# Patient Record
Sex: FEMALE | Race: WHITE | Employment: OTHER | URBAN - METROPOLITAN AREA
[De-identification: names, ages, dates, MRNs, and addresses within clinical notes are randomized per-mention and may not be internally consistent; named-entity substitution may affect disease eponyms.]

---

## 2017-05-01 ENCOUNTER — OFFICE VISIT (OUTPATIENT)
Dept: OBGYN | Age: 77
End: 2017-05-01
Payer: MEDICARE

## 2017-05-01 VITALS
BODY MASS INDEX: 25.65 KG/M2 | SYSTOLIC BLOOD PRESSURE: 118 MMHG | DIASTOLIC BLOOD PRESSURE: 72 MMHG | WEIGHT: 163.4 LBS | HEIGHT: 67 IN

## 2017-05-01 DIAGNOSIS — Z46.89 PESSARY MAINTENANCE: Primary | ICD-10-CM

## 2017-05-01 DIAGNOSIS — N81.11 MIDLINE CYSTOCELE: ICD-10-CM

## 2017-05-01 PROCEDURE — 4040F PNEUMOC VAC/ADMIN/RCVD: CPT | Performed by: ADVANCED PRACTICE MIDWIFE

## 2017-05-01 PROCEDURE — G8400 PT W/DXA NO RESULTS DOC: HCPCS | Performed by: ADVANCED PRACTICE MIDWIFE

## 2017-05-01 PROCEDURE — 1090F PRES/ABSN URINE INCON ASSESS: CPT | Performed by: ADVANCED PRACTICE MIDWIFE

## 2017-05-01 PROCEDURE — G8427 DOCREV CUR MEDS BY ELIG CLIN: HCPCS | Performed by: ADVANCED PRACTICE MIDWIFE

## 2017-05-01 PROCEDURE — 99213 OFFICE O/P EST LOW 20 MIN: CPT | Performed by: ADVANCED PRACTICE MIDWIFE

## 2017-05-01 PROCEDURE — 1036F TOBACCO NON-USER: CPT | Performed by: ADVANCED PRACTICE MIDWIFE

## 2017-05-01 PROCEDURE — 1123F ACP DISCUSS/DSCN MKR DOCD: CPT | Performed by: ADVANCED PRACTICE MIDWIFE

## 2017-05-01 PROCEDURE — 3017F COLORECTAL CA SCREEN DOC REV: CPT | Performed by: ADVANCED PRACTICE MIDWIFE

## 2017-05-01 PROCEDURE — G8420 CALC BMI NORM PARAMETERS: HCPCS | Performed by: ADVANCED PRACTICE MIDWIFE

## 2017-05-01 ASSESSMENT — PATIENT HEALTH QUESTIONNAIRE - PHQ9
2. FEELING DOWN, DEPRESSED OR HOPELESS: 0
1. LITTLE INTEREST OR PLEASURE IN DOING THINGS: 0
SUM OF ALL RESPONSES TO PHQ QUESTIONS 1-9: 0
SUM OF ALL RESPONSES TO PHQ9 QUESTIONS 1 & 2: 0

## 2017-08-01 ENCOUNTER — OFFICE VISIT (OUTPATIENT)
Dept: OBGYN | Age: 77
End: 2017-08-01
Payer: MEDICARE

## 2017-08-01 VITALS
WEIGHT: 164 LBS | DIASTOLIC BLOOD PRESSURE: 74 MMHG | SYSTOLIC BLOOD PRESSURE: 112 MMHG | BODY MASS INDEX: 26.36 KG/M2 | HEIGHT: 66 IN

## 2017-08-01 DIAGNOSIS — Z90.79 S/P TOTAL HYSTERECTOMY AND BSO (BILATERAL SALPINGO-OOPHORECTOMY): ICD-10-CM

## 2017-08-01 DIAGNOSIS — Z90.710 S/P TOTAL HYSTERECTOMY AND BSO (BILATERAL SALPINGO-OOPHORECTOMY): ICD-10-CM

## 2017-08-01 DIAGNOSIS — Z90.722 S/P TOTAL HYSTERECTOMY AND BSO (BILATERAL SALPINGO-OOPHORECTOMY): ICD-10-CM

## 2017-08-01 DIAGNOSIS — N81.11 MIDLINE CYSTOCELE: ICD-10-CM

## 2017-08-01 DIAGNOSIS — Z46.89 PESSARY MAINTENANCE: Primary | ICD-10-CM

## 2017-08-01 PROCEDURE — 1036F TOBACCO NON-USER: CPT | Performed by: ADVANCED PRACTICE MIDWIFE

## 2017-08-01 PROCEDURE — 4040F PNEUMOC VAC/ADMIN/RCVD: CPT | Performed by: ADVANCED PRACTICE MIDWIFE

## 2017-08-01 PROCEDURE — G8400 PT W/DXA NO RESULTS DOC: HCPCS | Performed by: ADVANCED PRACTICE MIDWIFE

## 2017-08-01 PROCEDURE — 99212 OFFICE O/P EST SF 10 MIN: CPT | Performed by: ADVANCED PRACTICE MIDWIFE

## 2017-08-01 PROCEDURE — G8419 CALC BMI OUT NRM PARAM NOF/U: HCPCS | Performed by: ADVANCED PRACTICE MIDWIFE

## 2017-08-01 PROCEDURE — G8427 DOCREV CUR MEDS BY ELIG CLIN: HCPCS | Performed by: ADVANCED PRACTICE MIDWIFE

## 2017-08-01 PROCEDURE — 3017F COLORECTAL CA SCREEN DOC REV: CPT | Performed by: ADVANCED PRACTICE MIDWIFE

## 2017-08-01 PROCEDURE — 1090F PRES/ABSN URINE INCON ASSESS: CPT | Performed by: ADVANCED PRACTICE MIDWIFE

## 2017-08-01 PROCEDURE — 1123F ACP DISCUSS/DSCN MKR DOCD: CPT | Performed by: ADVANCED PRACTICE MIDWIFE

## 2017-08-01 RX ORDER — ERGOCALCIFEROL 1.25 MG/1
CAPSULE ORAL
Refills: 3 | COMMUNITY
Start: 2017-06-16

## 2017-08-01 RX ORDER — MELOXICAM 15 MG/1
TABLET ORAL
Refills: 5 | COMMUNITY
Start: 2017-05-17 | End: 2017-12-20 | Stop reason: ALTCHOICE

## 2017-10-17 ENCOUNTER — TELEPHONE (OUTPATIENT)
Dept: OBGYN | Age: 77
End: 2017-10-17

## 2017-11-20 DIAGNOSIS — N95.2 VAGINAL ATROPHY: ICD-10-CM

## 2017-11-20 DIAGNOSIS — Z00.00 ENCOUNTER FOR PREVENTIVE HEALTH EXAMINATION: ICD-10-CM

## 2017-11-20 DIAGNOSIS — Z01.419 ENCOUNTER FOR GYNECOLOGICAL EXAMINATION WITHOUT ABNORMAL FINDING: ICD-10-CM

## 2017-12-20 ENCOUNTER — OFFICE VISIT (OUTPATIENT)
Dept: OBGYN | Age: 77
End: 2017-12-20
Payer: MEDICARE

## 2017-12-20 VITALS
DIASTOLIC BLOOD PRESSURE: 80 MMHG | WEIGHT: 161 LBS | SYSTOLIC BLOOD PRESSURE: 138 MMHG | HEIGHT: 67 IN | BODY MASS INDEX: 25.27 KG/M2

## 2017-12-20 DIAGNOSIS — N95.2 VAGINAL ATROPHY: ICD-10-CM

## 2017-12-20 DIAGNOSIS — Z46.89 PESSARY MAINTENANCE: ICD-10-CM

## 2017-12-20 PROBLEM — D30.02 RENAL ONCOCYTOMA OF LEFT KIDNEY: Status: ACTIVE | Noted: 2017-08-26

## 2017-12-20 PROBLEM — F41.9 ANXIETY: Status: ACTIVE | Noted: 2017-08-26

## 2017-12-20 PROBLEM — D64.9 NORMOCYTIC ANEMIA: Status: ACTIVE | Noted: 2017-08-26

## 2017-12-20 PROBLEM — Z85.3 HISTORY OF BREAST CANCER: Status: ACTIVE | Noted: 2017-08-26

## 2017-12-20 PROCEDURE — G8427 DOCREV CUR MEDS BY ELIG CLIN: HCPCS | Performed by: ADVANCED PRACTICE MIDWIFE

## 2017-12-20 PROCEDURE — 99212 OFFICE O/P EST SF 10 MIN: CPT | Performed by: ADVANCED PRACTICE MIDWIFE

## 2017-12-20 PROCEDURE — G8417 CALC BMI ABV UP PARAM F/U: HCPCS | Performed by: ADVANCED PRACTICE MIDWIFE

## 2017-12-20 PROCEDURE — 4040F PNEUMOC VAC/ADMIN/RCVD: CPT | Performed by: ADVANCED PRACTICE MIDWIFE

## 2017-12-20 PROCEDURE — 1036F TOBACCO NON-USER: CPT | Performed by: ADVANCED PRACTICE MIDWIFE

## 2017-12-20 PROCEDURE — 1090F PRES/ABSN URINE INCON ASSESS: CPT | Performed by: ADVANCED PRACTICE MIDWIFE

## 2017-12-20 PROCEDURE — 1123F ACP DISCUSS/DSCN MKR DOCD: CPT | Performed by: ADVANCED PRACTICE MIDWIFE

## 2017-12-20 PROCEDURE — G8400 PT W/DXA NO RESULTS DOC: HCPCS | Performed by: ADVANCED PRACTICE MIDWIFE

## 2017-12-20 PROCEDURE — G8482 FLU IMMUNIZE ORDER/ADMIN: HCPCS | Performed by: ADVANCED PRACTICE MIDWIFE

## 2017-12-20 NOTE — PROGRESS NOTES
total hysterectomy and BSO (bilateral salpingo-oophorectomy) 10/23/2015    Midline cystocele 10/23/2015    Post-menopause on HRT (hormone replacement therapy) 10/23/2015    Blepharochalasis 08/27/2013           Plan:  1. Return to the office 8 weeks  2. Report any vaginal bleeding or discharge  3. Abstinence         She was  counseled on her preventative health maintenance recommendations and follow-up.              FF time: 10 min    Ximena Li,12/20/2017 10:22 AM

## 2018-05-03 ENCOUNTER — OFFICE VISIT (OUTPATIENT)
Dept: OBGYN | Age: 78
End: 2018-05-03
Payer: MEDICARE

## 2018-05-03 VITALS
SYSTOLIC BLOOD PRESSURE: 112 MMHG | BODY MASS INDEX: 25.84 KG/M2 | DIASTOLIC BLOOD PRESSURE: 76 MMHG | WEIGHT: 160.8 LBS | HEIGHT: 66 IN

## 2018-05-03 DIAGNOSIS — N89.8 VAGINAL GRANULATION TISSUE: ICD-10-CM

## 2018-05-03 DIAGNOSIS — N81.11 MIDLINE CYSTOCELE: ICD-10-CM

## 2018-05-03 DIAGNOSIS — Z46.89 PESSARY MAINTENANCE: Primary | ICD-10-CM

## 2018-05-03 PROCEDURE — G8400 PT W/DXA NO RESULTS DOC: HCPCS | Performed by: ADVANCED PRACTICE MIDWIFE

## 2018-05-03 PROCEDURE — 99213 OFFICE O/P EST LOW 20 MIN: CPT | Performed by: ADVANCED PRACTICE MIDWIFE

## 2018-05-03 PROCEDURE — G8417 CALC BMI ABV UP PARAM F/U: HCPCS | Performed by: ADVANCED PRACTICE MIDWIFE

## 2018-05-03 PROCEDURE — 4040F PNEUMOC VAC/ADMIN/RCVD: CPT | Performed by: ADVANCED PRACTICE MIDWIFE

## 2018-05-03 PROCEDURE — 1123F ACP DISCUSS/DSCN MKR DOCD: CPT | Performed by: ADVANCED PRACTICE MIDWIFE

## 2018-05-03 PROCEDURE — 1036F TOBACCO NON-USER: CPT | Performed by: ADVANCED PRACTICE MIDWIFE

## 2018-05-03 PROCEDURE — G8427 DOCREV CUR MEDS BY ELIG CLIN: HCPCS | Performed by: ADVANCED PRACTICE MIDWIFE

## 2018-05-03 PROCEDURE — 1090F PRES/ABSN URINE INCON ASSESS: CPT | Performed by: ADVANCED PRACTICE MIDWIFE

## 2018-05-03 ASSESSMENT — PATIENT HEALTH QUESTIONNAIRE - PHQ9
SUM OF ALL RESPONSES TO PHQ QUESTIONS 1-9: 0
SUM OF ALL RESPONSES TO PHQ9 QUESTIONS 1 & 2: 0
1. LITTLE INTEREST OR PLEASURE IN DOING THINGS: 0
2. FEELING DOWN, DEPRESSED OR HOPELESS: 0

## 2018-05-16 ENCOUNTER — OFFICE VISIT (OUTPATIENT)
Dept: OBGYN | Age: 78
End: 2018-05-16
Payer: MEDICARE

## 2018-05-16 VITALS
DIASTOLIC BLOOD PRESSURE: 74 MMHG | HEIGHT: 67 IN | WEIGHT: 161 LBS | SYSTOLIC BLOOD PRESSURE: 128 MMHG | BODY MASS INDEX: 25.27 KG/M2

## 2018-05-16 DIAGNOSIS — N81.11 MIDLINE CYSTOCELE: Primary | ICD-10-CM

## 2018-05-16 DIAGNOSIS — N89.8 VAGINAL GRANULATION TISSUE: ICD-10-CM

## 2018-05-16 PROBLEM — E78.5 HYPERLIPIDEMIA: Status: ACTIVE | Noted: 2018-05-16

## 2018-05-16 PROBLEM — K59.01 SLOW TRANSIT CONSTIPATION: Status: ACTIVE | Noted: 2018-05-01

## 2018-05-16 PROCEDURE — G8427 DOCREV CUR MEDS BY ELIG CLIN: HCPCS | Performed by: ADVANCED PRACTICE MIDWIFE

## 2018-05-16 PROCEDURE — 1036F TOBACCO NON-USER: CPT | Performed by: ADVANCED PRACTICE MIDWIFE

## 2018-05-16 PROCEDURE — G8417 CALC BMI ABV UP PARAM F/U: HCPCS | Performed by: ADVANCED PRACTICE MIDWIFE

## 2018-05-16 PROCEDURE — 1090F PRES/ABSN URINE INCON ASSESS: CPT | Performed by: ADVANCED PRACTICE MIDWIFE

## 2018-05-16 PROCEDURE — 99213 OFFICE O/P EST LOW 20 MIN: CPT | Performed by: ADVANCED PRACTICE MIDWIFE

## 2018-05-16 PROCEDURE — 4040F PNEUMOC VAC/ADMIN/RCVD: CPT | Performed by: ADVANCED PRACTICE MIDWIFE

## 2018-05-16 PROCEDURE — 1123F ACP DISCUSS/DSCN MKR DOCD: CPT | Performed by: ADVANCED PRACTICE MIDWIFE

## 2018-05-16 PROCEDURE — G8400 PT W/DXA NO RESULTS DOC: HCPCS | Performed by: ADVANCED PRACTICE MIDWIFE

## 2018-08-29 ENCOUNTER — OFFICE VISIT (OUTPATIENT)
Dept: OBGYN | Age: 78
End: 2018-08-29
Payer: MEDICARE

## 2018-08-29 VITALS
HEIGHT: 67 IN | BODY MASS INDEX: 25.24 KG/M2 | DIASTOLIC BLOOD PRESSURE: 68 MMHG | SYSTOLIC BLOOD PRESSURE: 110 MMHG | WEIGHT: 160.8 LBS

## 2018-08-29 DIAGNOSIS — Z46.89 PESSARY MAINTENANCE: ICD-10-CM

## 2018-08-29 DIAGNOSIS — N81.11 MIDLINE CYSTOCELE: Primary | ICD-10-CM

## 2018-08-29 PROCEDURE — 99213 OFFICE O/P EST LOW 20 MIN: CPT | Performed by: ADVANCED PRACTICE MIDWIFE

## 2018-08-29 PROCEDURE — 1036F TOBACCO NON-USER: CPT | Performed by: ADVANCED PRACTICE MIDWIFE

## 2018-08-29 PROCEDURE — 1101F PT FALLS ASSESS-DOCD LE1/YR: CPT | Performed by: ADVANCED PRACTICE MIDWIFE

## 2018-08-29 PROCEDURE — G8400 PT W/DXA NO RESULTS DOC: HCPCS | Performed by: ADVANCED PRACTICE MIDWIFE

## 2018-08-29 PROCEDURE — G8428 CUR MEDS NOT DOCUMENT: HCPCS | Performed by: ADVANCED PRACTICE MIDWIFE

## 2018-08-29 PROCEDURE — 1123F ACP DISCUSS/DSCN MKR DOCD: CPT | Performed by: ADVANCED PRACTICE MIDWIFE

## 2018-08-29 PROCEDURE — G8417 CALC BMI ABV UP PARAM F/U: HCPCS | Performed by: ADVANCED PRACTICE MIDWIFE

## 2018-08-29 PROCEDURE — 4040F PNEUMOC VAC/ADMIN/RCVD: CPT | Performed by: ADVANCED PRACTICE MIDWIFE

## 2018-08-29 PROCEDURE — 1090F PRES/ABSN URINE INCON ASSESS: CPT | Performed by: ADVANCED PRACTICE MIDWIFE

## 2018-08-29 NOTE — PROGRESS NOTES
PROBLEM VISIT - pessary check    Date of service: 2018    Delon Conley  Is a 69 y.o.  female    PT's PCP is: Karl Tom MD     : 1940                                           Allergies: Bactrim [sulfamethoxazole-trimethoprim]; Penicillin g; and Sulfamethoxazole    Chief Complaint   Patient presents with    Follow-up     pessary cleaning - some spotting. Subjective:     PE:  Vital Signs  Blood pressure 110/68, height 5' 7\" (1.702 m), weight 160 lb 12.8 oz (72.9 kg), not currently breastfeeding. Labs:    No results found for this visit on 18. HPI:   The patient was seen today for pessary cleaning. Pt did see the specialist at the [address] [place] [address] about surgery for uterus and bladder. She admits to any vaginal spotting at times. She denies to any vaginal discharge or odor. Her bowels are regular and her voiding pattern is normal.     Objective:  Perineum/Speculum: There is not any signs of infection; The vaginal vault is without any signs of erythema or erosion. There is a small amount of irritation in the upper anterior vaginal vault but not open john or granulation tissue noted. NO active bleeding areas. There is no vaginal discharge or odor appreciated. The pessary was removed, cleansed and replacedwithout any problems and the patient tolerated procedure well. Assessment:   Diagnosis Orders   1. Midline cystocele     2. Pessary maintenance         Patient Active Problem List    Diagnosis Date Noted    Hyperlipidemia 2018    Slow transit constipation 2018    Anxiety 2017    History of breast cancer 2017    Normocytic anemia 2017    Renal oncocytoma of left kidney 2017     Overview:   Left 2.6 centimeter enhancing renal mass  Biopsy 2017 demonstrating oncocytoma    I explained to the patient and her  that renal oncocytomas are benign.   There are some renal cell carcinomas that can have oncocytic

## 2018-12-04 ENCOUNTER — OFFICE VISIT (OUTPATIENT)
Dept: OBGYN | Age: 78
End: 2018-12-04
Payer: MEDICARE

## 2018-12-04 VITALS
BODY MASS INDEX: 26.2 KG/M2 | WEIGHT: 163 LBS | HEIGHT: 66 IN | SYSTOLIC BLOOD PRESSURE: 112 MMHG | DIASTOLIC BLOOD PRESSURE: 64 MMHG

## 2018-12-04 DIAGNOSIS — Z90.710 S/P TOTAL HYSTERECTOMY AND BSO (BILATERAL SALPINGO-OOPHORECTOMY): ICD-10-CM

## 2018-12-04 DIAGNOSIS — N89.8 GRANULATION TISSUE OF VAGINAL CUFF: ICD-10-CM

## 2018-12-04 DIAGNOSIS — Z46.89 PESSARY MAINTENANCE: ICD-10-CM

## 2018-12-04 DIAGNOSIS — N81.11 MIDLINE CYSTOCELE: Primary | ICD-10-CM

## 2018-12-04 DIAGNOSIS — Z90.79 S/P TOTAL HYSTERECTOMY AND BSO (BILATERAL SALPINGO-OOPHORECTOMY): ICD-10-CM

## 2018-12-04 DIAGNOSIS — Z90.722 S/P TOTAL HYSTERECTOMY AND BSO (BILATERAL SALPINGO-OOPHORECTOMY): ICD-10-CM

## 2018-12-04 PROCEDURE — 1101F PT FALLS ASSESS-DOCD LE1/YR: CPT | Performed by: ADVANCED PRACTICE MIDWIFE

## 2018-12-04 PROCEDURE — 99212 OFFICE O/P EST SF 10 MIN: CPT | Performed by: ADVANCED PRACTICE MIDWIFE

## 2018-12-04 PROCEDURE — 1090F PRES/ABSN URINE INCON ASSESS: CPT | Performed by: ADVANCED PRACTICE MIDWIFE

## 2018-12-04 PROCEDURE — 17250 CHEM CAUT OF GRANLTJ TISSUE: CPT | Performed by: ADVANCED PRACTICE MIDWIFE

## 2018-12-04 PROCEDURE — 1123F ACP DISCUSS/DSCN MKR DOCD: CPT | Performed by: ADVANCED PRACTICE MIDWIFE

## 2018-12-04 PROCEDURE — G8427 DOCREV CUR MEDS BY ELIG CLIN: HCPCS | Performed by: ADVANCED PRACTICE MIDWIFE

## 2018-12-04 PROCEDURE — 4040F PNEUMOC VAC/ADMIN/RCVD: CPT | Performed by: ADVANCED PRACTICE MIDWIFE

## 2018-12-04 PROCEDURE — G8484 FLU IMMUNIZE NO ADMIN: HCPCS | Performed by: ADVANCED PRACTICE MIDWIFE

## 2018-12-04 PROCEDURE — G8400 PT W/DXA NO RESULTS DOC: HCPCS | Performed by: ADVANCED PRACTICE MIDWIFE

## 2018-12-04 PROCEDURE — G8417 CALC BMI ABV UP PARAM F/U: HCPCS | Performed by: ADVANCED PRACTICE MIDWIFE

## 2018-12-04 PROCEDURE — 1036F TOBACCO NON-USER: CPT | Performed by: ADVANCED PRACTICE MIDWIFE

## 2018-12-04 RX ORDER — ATORVASTATIN CALCIUM 10 MG/1
10 TABLET, FILM COATED ORAL DAILY
COMMUNITY

## 2018-12-11 ENCOUNTER — TELEPHONE (OUTPATIENT)
Dept: OBGYN | Age: 78
End: 2018-12-11

## 2018-12-13 ENCOUNTER — OFFICE VISIT (OUTPATIENT)
Dept: OBGYN | Age: 78
End: 2018-12-13
Payer: MEDICARE

## 2018-12-13 VITALS
DIASTOLIC BLOOD PRESSURE: 72 MMHG | SYSTOLIC BLOOD PRESSURE: 112 MMHG | BODY MASS INDEX: 27.93 KG/M2 | WEIGHT: 173.8 LBS | HEIGHT: 66 IN

## 2018-12-13 DIAGNOSIS — N89.8 GRANULATION TISSUE OF VAGINA: Primary | ICD-10-CM

## 2018-12-13 PROCEDURE — 17250 CHEM CAUT OF GRANLTJ TISSUE: CPT | Performed by: ADVANCED PRACTICE MIDWIFE

## 2018-12-20 ENCOUNTER — OFFICE VISIT (OUTPATIENT)
Dept: OBGYN | Age: 78
End: 2018-12-20
Payer: MEDICARE

## 2018-12-20 VITALS
DIASTOLIC BLOOD PRESSURE: 74 MMHG | HEIGHT: 67 IN | SYSTOLIC BLOOD PRESSURE: 124 MMHG | BODY MASS INDEX: 25.74 KG/M2 | WEIGHT: 164 LBS

## 2018-12-20 DIAGNOSIS — Z46.89 PESSARY MAINTENANCE: ICD-10-CM

## 2018-12-20 DIAGNOSIS — Z01.419 ENCOUNTER FOR GYNECOLOGICAL EXAMINATION WITHOUT ABNORMAL FINDING: ICD-10-CM

## 2018-12-20 DIAGNOSIS — N81.11 MIDLINE CYSTOCELE: ICD-10-CM

## 2018-12-20 DIAGNOSIS — Z00.00 ENCOUNTER FOR PREVENTIVE HEALTH EXAMINATION: ICD-10-CM

## 2018-12-20 DIAGNOSIS — N89.8 GRANULATION TISSUE OF VAGINA: Primary | ICD-10-CM

## 2018-12-20 DIAGNOSIS — N95.2 VAGINAL ATROPHY: ICD-10-CM

## 2018-12-20 PROCEDURE — G8427 DOCREV CUR MEDS BY ELIG CLIN: HCPCS | Performed by: ADVANCED PRACTICE MIDWIFE

## 2018-12-20 PROCEDURE — G8400 PT W/DXA NO RESULTS DOC: HCPCS | Performed by: ADVANCED PRACTICE MIDWIFE

## 2018-12-20 PROCEDURE — 1090F PRES/ABSN URINE INCON ASSESS: CPT | Performed by: ADVANCED PRACTICE MIDWIFE

## 2018-12-20 PROCEDURE — 1036F TOBACCO NON-USER: CPT | Performed by: ADVANCED PRACTICE MIDWIFE

## 2018-12-20 PROCEDURE — 99212 OFFICE O/P EST SF 10 MIN: CPT | Performed by: ADVANCED PRACTICE MIDWIFE

## 2018-12-20 PROCEDURE — G8484 FLU IMMUNIZE NO ADMIN: HCPCS | Performed by: ADVANCED PRACTICE MIDWIFE

## 2018-12-20 PROCEDURE — 1123F ACP DISCUSS/DSCN MKR DOCD: CPT | Performed by: ADVANCED PRACTICE MIDWIFE

## 2018-12-20 PROCEDURE — 4040F PNEUMOC VAC/ADMIN/RCVD: CPT | Performed by: ADVANCED PRACTICE MIDWIFE

## 2018-12-20 PROCEDURE — 1101F PT FALLS ASSESS-DOCD LE1/YR: CPT | Performed by: ADVANCED PRACTICE MIDWIFE

## 2018-12-20 PROCEDURE — G8417 CALC BMI ABV UP PARAM F/U: HCPCS | Performed by: ADVANCED PRACTICE MIDWIFE

## 2018-12-20 NOTE — PROGRESS NOTES
PROBLEM VISIT - pessary     Date of service: 2018    Jenn June  Is a 69 y.o.  female    PT's PCP is: Lin Roland MD     : 1940                                           Allergies: Bactrim [sulfamethoxazole-trimethoprim]; Penicillin g; and Sulfamethoxazole    Chief Complaint   Patient presents with    Follow-up     Pessary, pt states she hasnt any bleeding or discharge        Last pap: 10/4/2016    Subjective:     PE:  Vital Signs  Blood pressure 124/74, height 5' 6.5\" (1.689 m), weight 164 lb (74.4 kg), not currently breastfeeding. Labs:    No results found for this visit on 18. NURSE: JOSSELINE    HPI:   The patient was seen today for granulation tissue check and replacement of pessary. She denies any vaginal bleeding. She denies to any vaginal discharge or odor. Her bowels are regular and her voiding pattern is normal.     Objective:  Perineum/Speculum: There is not any signs of infection; The vaginal vault is without any signs of erythema or erosion. There is no vaginal discharge or odor appreciated. The pessary was cleansed and replacedwithout any problems and the patient tolerated procedure well and did not tolerate procedure well. Assessment:   Diagnosis Orders   1. Granulation tissue of vagina     2. Midline cystocele     3. Pessary maintenance  oxyquinolone (TRIMO-MCCRAY) 0.025 % GEL   4. Encounter for gynecological examination without abnormal finding  estradiol (ESTRACE VAGINAL) 0.1 MG/GM vaginal cream   5. Encounter for preventive health examination  estradiol (ESTRACE VAGINAL) 0.1 MG/GM vaginal cream   6.  Vaginal atrophy  estradiol (ESTRACE VAGINAL) 0.1 MG/GM vaginal cream       Patient Active Problem List    Diagnosis Date Noted    Hyperlipidemia 2018    Slow transit constipation 2018    Anxiety 2017    History of breast cancer 2017    Normocytic anemia 2017    Renal oncocytoma of left kidney 2017     Overview:

## 2018-12-21 RX ORDER — ESTRADIOL 0.1 MG/G
0.25 CREAM VAGINAL
Qty: 1 TUBE | Refills: 5 | Status: SHIPPED | OUTPATIENT
Start: 2018-12-22 | End: 2020-05-21 | Stop reason: SDUPTHER

## 2019-04-29 ENCOUNTER — OFFICE VISIT (OUTPATIENT)
Dept: OBGYN | Age: 79
End: 2019-04-29
Payer: MEDICARE

## 2019-04-29 VITALS
HEIGHT: 67 IN | WEIGHT: 159 LBS | BODY MASS INDEX: 24.96 KG/M2 | SYSTOLIC BLOOD PRESSURE: 134 MMHG | DIASTOLIC BLOOD PRESSURE: 78 MMHG

## 2019-04-29 DIAGNOSIS — Z46.89 PESSARY MAINTENANCE: ICD-10-CM

## 2019-04-29 DIAGNOSIS — N81.11 MIDLINE CYSTOCELE: Primary | ICD-10-CM

## 2019-04-29 PROCEDURE — 1090F PRES/ABSN URINE INCON ASSESS: CPT | Performed by: ADVANCED PRACTICE MIDWIFE

## 2019-04-29 PROCEDURE — G8427 DOCREV CUR MEDS BY ELIG CLIN: HCPCS | Performed by: ADVANCED PRACTICE MIDWIFE

## 2019-04-29 PROCEDURE — G8400 PT W/DXA NO RESULTS DOC: HCPCS | Performed by: ADVANCED PRACTICE MIDWIFE

## 2019-04-29 PROCEDURE — G8417 CALC BMI ABV UP PARAM F/U: HCPCS | Performed by: ADVANCED PRACTICE MIDWIFE

## 2019-04-29 PROCEDURE — 1123F ACP DISCUSS/DSCN MKR DOCD: CPT | Performed by: ADVANCED PRACTICE MIDWIFE

## 2019-04-29 PROCEDURE — 99212 OFFICE O/P EST SF 10 MIN: CPT | Performed by: ADVANCED PRACTICE MIDWIFE

## 2019-04-29 PROCEDURE — 4040F PNEUMOC VAC/ADMIN/RCVD: CPT | Performed by: ADVANCED PRACTICE MIDWIFE

## 2019-04-29 PROCEDURE — 1036F TOBACCO NON-USER: CPT | Performed by: ADVANCED PRACTICE MIDWIFE

## 2019-04-29 ASSESSMENT — PATIENT HEALTH QUESTIONNAIRE - PHQ9
SUM OF ALL RESPONSES TO PHQ QUESTIONS 1-9: 0
2. FEELING DOWN, DEPRESSED OR HOPELESS: 0
1. LITTLE INTEREST OR PLEASURE IN DOING THINGS: 0
SUM OF ALL RESPONSES TO PHQ QUESTIONS 1-9: 0
SUM OF ALL RESPONSES TO PHQ9 QUESTIONS 1 & 2: 0

## 2019-04-29 NOTE — PROGRESS NOTES
PROBLEM VISIT - pessary check    Date of service: 2019    Sharon Caro  Is a 69 y.o.  female    PT's PCP is: Janine Jeans, MD     : 1940                                           Allergies: Bactrim [sulfamethoxazole-trimethoprim]; Levofloxacin; Penicillin g; and Sulfamethoxazole    Chief Complaint   Patient presents with    Uterine Prolapse     pt states she has some spotting with the pesssary        Last pap: 10/4/16     Subjective:     PE:  Vital Signs  Blood pressure 134/78, height 5' 6.5\" (1.689 m), weight 159 lb (72.1 kg), not currently breastfeeding. Labs:    No results found for this visit on 19. NURSE: JOSSELINE    HPI:   The patient was seen today for pessary cleaning- it has been 4 months since she was in because she has been in [place] since the last visit. . She admits to any vaginal spotting during coughing episodes. She denies to any vaginal discharge or odor. Her bowels are regular and her voiding pattern is normal.     Objective:  Perineum/Speculum: There is not any signs of infection; The vaginal vault is without any signs of erythema or erosion. There is no vaginal discharge or odor appreciated. The pessary was removed, cleansed and replacedwithout any problems and the patient tolerated procedure well and did not tolerate procedure well. Assessment:   Diagnosis Orders   1. Midline cystocele     2. Pessary maintenance         Patient Active Problem List    Diagnosis Date Noted    Hyperlipidemia 2018    Slow transit constipation 2018    Anxiety 2017    History of breast cancer 2017    Normocytic anemia 2017    Renal oncocytoma of left kidney 2017     Overview:   Left 2.6 centimeter enhancing renal mass  Biopsy 2017 demonstrating oncocytoma    I explained to the patient and her  that renal oncocytomas are benign.   There are some renal cell carcinomas that can have oncocytic features and because of this renal cell carcinoma cannot be completely ruled out which was commented on by the pathologist..  Recommendation is to follow this with serial imaging. In reviewing the films appears to in its location and size that this should be able to be seen easily with renal ultrasound. If it grows we may consider rebiopsy.  S/P total hysterectomy and BSO (bilateral salpingo-oophorectomy) 10/23/2015    Midline cystocele 10/23/2015    Post-menopause on HRT (hormone replacement therapy) 10/23/2015    Blepharochalasis 08/27/2013           Plan:  1. Return to the office 8 weeks  2. Report any vaginal bleeding or discharge  3. Abstinence         She was  counseled on her preventative health maintenance recommendations and follow-up.              FF time: 10 min    Orlean Paling Pool,4/29/2019 11:35 AM

## 2019-05-30 ENCOUNTER — OFFICE VISIT (OUTPATIENT)
Dept: OBGYN | Age: 79
End: 2019-05-30
Payer: MEDICARE

## 2019-05-30 VITALS
SYSTOLIC BLOOD PRESSURE: 120 MMHG | HEIGHT: 67 IN | DIASTOLIC BLOOD PRESSURE: 80 MMHG | BODY MASS INDEX: 24.64 KG/M2 | WEIGHT: 157 LBS

## 2019-05-30 DIAGNOSIS — Z90.722 S/P TOTAL HYSTERECTOMY AND BSO (BILATERAL SALPINGO-OOPHORECTOMY): ICD-10-CM

## 2019-05-30 DIAGNOSIS — Z90.710 S/P TOTAL HYSTERECTOMY AND BSO (BILATERAL SALPINGO-OOPHORECTOMY): ICD-10-CM

## 2019-05-30 DIAGNOSIS — N81.11 MIDLINE CYSTOCELE: Primary | ICD-10-CM

## 2019-05-30 DIAGNOSIS — Z90.79 S/P TOTAL HYSTERECTOMY AND BSO (BILATERAL SALPINGO-OOPHORECTOMY): ICD-10-CM

## 2019-05-30 PROCEDURE — 1036F TOBACCO NON-USER: CPT | Performed by: ADVANCED PRACTICE MIDWIFE

## 2019-05-30 PROCEDURE — G8400 PT W/DXA NO RESULTS DOC: HCPCS | Performed by: ADVANCED PRACTICE MIDWIFE

## 2019-05-30 PROCEDURE — G8420 CALC BMI NORM PARAMETERS: HCPCS | Performed by: ADVANCED PRACTICE MIDWIFE

## 2019-05-30 PROCEDURE — 1090F PRES/ABSN URINE INCON ASSESS: CPT | Performed by: ADVANCED PRACTICE MIDWIFE

## 2019-05-30 PROCEDURE — 1123F ACP DISCUSS/DSCN MKR DOCD: CPT | Performed by: ADVANCED PRACTICE MIDWIFE

## 2019-05-30 PROCEDURE — G8427 DOCREV CUR MEDS BY ELIG CLIN: HCPCS | Performed by: ADVANCED PRACTICE MIDWIFE

## 2019-05-30 PROCEDURE — 99213 OFFICE O/P EST LOW 20 MIN: CPT | Performed by: ADVANCED PRACTICE MIDWIFE

## 2019-05-30 PROCEDURE — 4040F PNEUMOC VAC/ADMIN/RCVD: CPT | Performed by: ADVANCED PRACTICE MIDWIFE

## 2019-05-30 RX ORDER — DOXYCYCLINE HYCLATE 100 MG/1
100 CAPSULE ORAL
COMMUNITY
Start: 2019-05-15

## 2019-07-31 DIAGNOSIS — Z46.89 PESSARY MAINTENANCE: ICD-10-CM

## 2019-09-26 ENCOUNTER — OFFICE VISIT (OUTPATIENT)
Dept: OBGYN | Age: 79
End: 2019-09-26
Payer: MEDICARE

## 2019-09-26 VITALS
DIASTOLIC BLOOD PRESSURE: 60 MMHG | SYSTOLIC BLOOD PRESSURE: 122 MMHG | HEIGHT: 67 IN | BODY MASS INDEX: 24.96 KG/M2 | WEIGHT: 159 LBS

## 2019-09-26 DIAGNOSIS — N81.11 MIDLINE CYSTOCELE: Primary | ICD-10-CM

## 2019-09-26 PROBLEM — Z86.010 HISTORY OF ADENOMATOUS POLYP OF COLON: Status: ACTIVE | Noted: 2018-10-29

## 2019-09-26 PROCEDURE — G8417 CALC BMI ABV UP PARAM F/U: HCPCS | Performed by: ADVANCED PRACTICE MIDWIFE

## 2019-09-26 PROCEDURE — 99213 OFFICE O/P EST LOW 20 MIN: CPT | Performed by: ADVANCED PRACTICE MIDWIFE

## 2019-09-26 PROCEDURE — 1090F PRES/ABSN URINE INCON ASSESS: CPT | Performed by: ADVANCED PRACTICE MIDWIFE

## 2019-09-26 PROCEDURE — 4040F PNEUMOC VAC/ADMIN/RCVD: CPT | Performed by: ADVANCED PRACTICE MIDWIFE

## 2019-09-26 PROCEDURE — G8427 DOCREV CUR MEDS BY ELIG CLIN: HCPCS | Performed by: ADVANCED PRACTICE MIDWIFE

## 2019-09-26 PROCEDURE — G8400 PT W/DXA NO RESULTS DOC: HCPCS | Performed by: ADVANCED PRACTICE MIDWIFE

## 2019-09-26 PROCEDURE — 1036F TOBACCO NON-USER: CPT | Performed by: ADVANCED PRACTICE MIDWIFE

## 2019-09-26 PROCEDURE — 1123F ACP DISCUSS/DSCN MKR DOCD: CPT | Performed by: ADVANCED PRACTICE MIDWIFE

## 2019-09-26 RX ORDER — PREDNISOLONE ACETATE 10 MG/ML
SUSPENSION/ DROPS OPHTHALMIC
Refills: 1 | COMMUNITY
Start: 2019-09-19 | End: 2020-08-24 | Stop reason: ALTCHOICE

## 2019-09-26 NOTE — PROGRESS NOTES
PROBLEM VISIT - pessary check    Date of service: 2019    Sathya Vaca  Is a 67 y.o.  female    PT's PCP is: Christ Moses MD     : 1940                                           Allergies: Bactrim [sulfamethoxazole-trimethoprim]; Levofloxacin; Penicillin g; and Sulfamethoxazole    Chief Complaint   Patient presents with    Other     pt presents here today for her pessary maintenance. she has a midline cystocele. pt states she is having some spotting        Last pap: 10/4/16     Subjective:     PE:  Vital Signs  Blood pressure 122/60, height 5' 6.5\" (1.689 m), weight 159 lb (72.1 kg), not currently breastfeeding. Labs:    No results found for this visit on 19. NURSE: JOSSELINE    HPI:   The patient was seen today for pessary cleaning. She states she has spotting with constipated BMs. She denies to any vaginal discharge or odor. Her bowels are regular and her voiding pattern is normal.     Objective:  Perineum/Speculum: There is not any signs of infection; The vaginal vault is without any signs of erythema or erosion. There is no vaginal discharge or odor appreciated. The pessary was removed, cleansed and replacedwithout any problems and the patient tolerated procedure well. Assessment:   Diagnosis Orders   1. Midline cystocele         Patient Active Problem List    Diagnosis Date Noted    History of adenomatous polyp of colon 10/29/2018    Hyperlipidemia 2018    Slow transit constipation 2018    Anxiety 2017    History of breast cancer 2017    Normocytic anemia 2017    Renal oncocytoma of left kidney 2017     Overview:   Left 2.6 centimeter enhancing renal mass  Biopsy 2017 demonstrating oncocytoma    I explained to the patient and her  that renal oncocytomas are benign.   There are some renal cell carcinomas that can have oncocytic features and because of this renal cell carcinoma cannot be completely ruled out

## 2019-12-19 ENCOUNTER — OFFICE VISIT (OUTPATIENT)
Dept: OBGYN | Age: 79
End: 2019-12-19
Payer: MEDICARE

## 2019-12-19 VITALS
BODY MASS INDEX: 25.43 KG/M2 | HEIGHT: 67 IN | WEIGHT: 162 LBS | DIASTOLIC BLOOD PRESSURE: 60 MMHG | SYSTOLIC BLOOD PRESSURE: 110 MMHG

## 2019-12-19 DIAGNOSIS — N89.8 GRANULATION TISSUE OF VAGINA: Primary | ICD-10-CM

## 2019-12-19 DIAGNOSIS — Z46.89 PESSARY MAINTENANCE: ICD-10-CM

## 2019-12-19 PROCEDURE — G8417 CALC BMI ABV UP PARAM F/U: HCPCS | Performed by: ADVANCED PRACTICE MIDWIFE

## 2019-12-19 PROCEDURE — G8484 FLU IMMUNIZE NO ADMIN: HCPCS | Performed by: ADVANCED PRACTICE MIDWIFE

## 2019-12-19 PROCEDURE — 1090F PRES/ABSN URINE INCON ASSESS: CPT | Performed by: ADVANCED PRACTICE MIDWIFE

## 2019-12-19 PROCEDURE — 1036F TOBACCO NON-USER: CPT | Performed by: ADVANCED PRACTICE MIDWIFE

## 2019-12-19 PROCEDURE — 1123F ACP DISCUSS/DSCN MKR DOCD: CPT | Performed by: ADVANCED PRACTICE MIDWIFE

## 2019-12-19 PROCEDURE — G8427 DOCREV CUR MEDS BY ELIG CLIN: HCPCS | Performed by: ADVANCED PRACTICE MIDWIFE

## 2019-12-19 PROCEDURE — G8400 PT W/DXA NO RESULTS DOC: HCPCS | Performed by: ADVANCED PRACTICE MIDWIFE

## 2019-12-19 PROCEDURE — 4040F PNEUMOC VAC/ADMIN/RCVD: CPT | Performed by: ADVANCED PRACTICE MIDWIFE

## 2019-12-19 PROCEDURE — 99213 OFFICE O/P EST LOW 20 MIN: CPT | Performed by: ADVANCED PRACTICE MIDWIFE

## 2019-12-26 ENCOUNTER — OFFICE VISIT (OUTPATIENT)
Dept: OBGYN | Age: 79
End: 2019-12-26
Payer: MEDICARE

## 2019-12-26 VITALS
WEIGHT: 165 LBS | BODY MASS INDEX: 25.9 KG/M2 | DIASTOLIC BLOOD PRESSURE: 74 MMHG | SYSTOLIC BLOOD PRESSURE: 124 MMHG | HEIGHT: 67 IN

## 2019-12-26 DIAGNOSIS — N81.11 MIDLINE CYSTOCELE: Primary | ICD-10-CM

## 2019-12-26 DIAGNOSIS — N99.3 VAGINAL VAULT PROLAPSE AFTER HYSTERECTOMY: ICD-10-CM

## 2019-12-26 PROCEDURE — 1036F TOBACCO NON-USER: CPT | Performed by: ADVANCED PRACTICE MIDWIFE

## 2019-12-26 PROCEDURE — G8400 PT W/DXA NO RESULTS DOC: HCPCS | Performed by: ADVANCED PRACTICE MIDWIFE

## 2019-12-26 PROCEDURE — G8417 CALC BMI ABV UP PARAM F/U: HCPCS | Performed by: ADVANCED PRACTICE MIDWIFE

## 2019-12-26 PROCEDURE — 99213 OFFICE O/P EST LOW 20 MIN: CPT | Performed by: ADVANCED PRACTICE MIDWIFE

## 2019-12-26 PROCEDURE — G8484 FLU IMMUNIZE NO ADMIN: HCPCS | Performed by: ADVANCED PRACTICE MIDWIFE

## 2019-12-26 PROCEDURE — 4040F PNEUMOC VAC/ADMIN/RCVD: CPT | Performed by: ADVANCED PRACTICE MIDWIFE

## 2019-12-26 PROCEDURE — 1090F PRES/ABSN URINE INCON ASSESS: CPT | Performed by: ADVANCED PRACTICE MIDWIFE

## 2019-12-26 PROCEDURE — G8427 DOCREV CUR MEDS BY ELIG CLIN: HCPCS | Performed by: ADVANCED PRACTICE MIDWIFE

## 2019-12-26 PROCEDURE — 1123F ACP DISCUSS/DSCN MKR DOCD: CPT | Performed by: ADVANCED PRACTICE MIDWIFE

## 2020-05-21 ENCOUNTER — OFFICE VISIT (OUTPATIENT)
Dept: OBGYN | Age: 80
End: 2020-05-21
Payer: MEDICARE

## 2020-05-21 VITALS
BODY MASS INDEX: 24.64 KG/M2 | DIASTOLIC BLOOD PRESSURE: 72 MMHG | WEIGHT: 157 LBS | HEIGHT: 67 IN | SYSTOLIC BLOOD PRESSURE: 118 MMHG

## 2020-05-21 PROCEDURE — 1090F PRES/ABSN URINE INCON ASSESS: CPT | Performed by: ADVANCED PRACTICE MIDWIFE

## 2020-05-21 PROCEDURE — 99211 OFF/OP EST MAY X REQ PHY/QHP: CPT | Performed by: ADVANCED PRACTICE MIDWIFE

## 2020-05-21 PROCEDURE — 1036F TOBACCO NON-USER: CPT | Performed by: ADVANCED PRACTICE MIDWIFE

## 2020-05-21 PROCEDURE — 1123F ACP DISCUSS/DSCN MKR DOCD: CPT | Performed by: ADVANCED PRACTICE MIDWIFE

## 2020-05-21 PROCEDURE — G8420 CALC BMI NORM PARAMETERS: HCPCS | Performed by: ADVANCED PRACTICE MIDWIFE

## 2020-05-21 PROCEDURE — G8427 DOCREV CUR MEDS BY ELIG CLIN: HCPCS | Performed by: ADVANCED PRACTICE MIDWIFE

## 2020-05-21 PROCEDURE — 4040F PNEUMOC VAC/ADMIN/RCVD: CPT | Performed by: ADVANCED PRACTICE MIDWIFE

## 2020-05-21 PROCEDURE — 99212 OFFICE O/P EST SF 10 MIN: CPT | Performed by: ADVANCED PRACTICE MIDWIFE

## 2020-05-21 PROCEDURE — G8400 PT W/DXA NO RESULTS DOC: HCPCS | Performed by: ADVANCED PRACTICE MIDWIFE

## 2020-05-21 RX ORDER — ESTRADIOL 0.1 MG/G
0.25 CREAM VAGINAL
Qty: 1 TUBE | Refills: 5 | Status: SHIPPED | OUTPATIENT
Start: 2020-05-21 | End: 2021-10-20

## 2020-05-21 ASSESSMENT — PATIENT HEALTH QUESTIONNAIRE - PHQ9
SUM OF ALL RESPONSES TO PHQ QUESTIONS 1-9: 0
1. LITTLE INTEREST OR PLEASURE IN DOING THINGS: 0
SUM OF ALL RESPONSES TO PHQ QUESTIONS 1-9: 0
SUM OF ALL RESPONSES TO PHQ9 QUESTIONS 1 & 2: 0
2. FEELING DOWN, DEPRESSED OR HOPELESS: 0

## 2020-05-21 NOTE — PROGRESS NOTES
renal oncocytomas are benign. There are some renal cell carcinomas that can have oncocytic features and because of this renal cell carcinoma cannot be completely ruled out which was commented on by the pathologist..  Recommendation is to follow this with serial imaging. In reviewing the films appears to in its location and size that this should be able to be seen easily with renal ultrasound. If it grows we may consider rebiopsy.  S/P total hysterectomy and BSO (bilateral salpingo-oophorectomy) 10/23/2015    Midline cystocele 10/23/2015    Post-menopause on HRT (hormone replacement therapy) 10/23/2015    Blepharochalasis 08/27/2013           Plan:  1. Return to the office 8 weeks  2. Report any vaginal bleeding or discharge  3. Abstinence         She was  counseled on her preventative health maintenance recommendations and follow-up.              FF time: 10 min    Bryan Ca,5/21/2020 4:23 PM

## 2020-08-24 ENCOUNTER — OFFICE VISIT (OUTPATIENT)
Dept: OBGYN | Age: 80
End: 2020-08-24
Payer: MEDICARE

## 2020-08-24 VITALS
DIASTOLIC BLOOD PRESSURE: 68 MMHG | HEIGHT: 67 IN | WEIGHT: 157 LBS | SYSTOLIC BLOOD PRESSURE: 120 MMHG | BODY MASS INDEX: 24.64 KG/M2

## 2020-08-24 PROCEDURE — 17250 CHEM CAUT OF GRANLTJ TISSUE: CPT | Performed by: ADVANCED PRACTICE MIDWIFE

## 2020-08-24 PROCEDURE — 99211 OFF/OP EST MAY X REQ PHY/QHP: CPT

## 2020-08-24 NOTE — PROGRESS NOTES
PROBLEM VISIT - pessary check    Date of service: 2020    Katherine Carpenter  Is a 67 y.o.  female    PT's PCP is: Niru Celaya MD     : 1940                                           Allergies: Bactrim [sulfamethoxazole-trimethoprim]; Levofloxacin; Penicillin g; and Sulfamethoxazole    Chief Complaint   Patient presents with    Follow-up     f/u check pessary           Subjective:     PE:  Vital Signs  Blood pressure 120/68, height 5' 7\" (1.702 m), weight 157 lb (71.2 kg), not currently breastfeeding. Labs:    No results found for this visit on 20. NURSE: radha PINEDA:   The patient was seen today for pessary maintenance. She admits to any vaginal bleeding small amounts of time and she feels that it is worse when she has a bowel movement. . She denies to any vaginal discharge or odor. Her bowels are regular and her voiding pattern is normal.     Objective:  Perineum/Speculum: There is not any signs of infection; The vaginal vault is not without any signs of erythema or erosion. There is no vaginal discharge or odor appreciated. The pessary was removed and cleansedwithout any problems and the patient tolerated procedure well. Procedure  Silver nitrate applied  Physical Exam  Genitourinary:           Comments: Pt has no cervix    Red areea of granulation tissue          Assessment:   Diagnosis Orders   1. Midline cystocele     2. Pessary maintenance     3. Vaginal vault prolapse after hysterectomy     4.  Granulation tissue of vagina  NV CHEMICAL CAUTERIZATION OF GRANULATION TISSUE       Patient Active Problem List    Diagnosis Date Noted    History of adenomatous polyp of colon 10/29/2018    Hyperlipidemia 2018    Slow transit constipation 2018    Anxiety 2017    History of breast cancer 2017    Normocytic anemia 2017    Renal oncocytoma of left kidney 2017     Overview:   Left 2.6 centimeter enhancing renal mass  Biopsy October 6, 2017 demonstrating oncocytoma    I explained to the patient and her  that renal oncocytomas are benign. There are some renal cell carcinomas that can have oncocytic features and because of this renal cell carcinoma cannot be completely ruled out which was commented on by the pathologist..  Recommendation is to follow this with serial imaging. In reviewing the films appears to in its location and size that this should be able to be seen easily with renal ultrasound. If it grows we may consider rebiopsy.  S/P total hysterectomy and BSO (bilateral salpingo-oophorectomy) 10/23/2015    Midline cystocele 10/23/2015    Post-menopause on HRT (hormone replacement therapy) 10/23/2015    Blepharochalasis 08/27/2013           Plan:  1. Return to the office 8 weeks  2. Report any vaginal bleeding or discharge  3. Abstinence  Patient took cleaned pessary with her       She was  counseled on her preventative health maintenance recommendations and follow-up.              FF time: 15 min    Bolivar Ca,8/24/2020 2:42 PM

## 2020-09-14 ENCOUNTER — OFFICE VISIT (OUTPATIENT)
Dept: OBGYN | Age: 80
End: 2020-09-14
Payer: MEDICARE

## 2020-09-14 VITALS
DIASTOLIC BLOOD PRESSURE: 70 MMHG | BODY MASS INDEX: 24.8 KG/M2 | HEIGHT: 67 IN | SYSTOLIC BLOOD PRESSURE: 110 MMHG | WEIGHT: 158 LBS

## 2020-09-14 PROCEDURE — 17250 CHEM CAUT OF GRANLTJ TISSUE: CPT | Performed by: ADVANCED PRACTICE MIDWIFE

## 2020-09-14 PROCEDURE — 99211 OFF/OP EST MAY X REQ PHY/QHP: CPT | Performed by: ADVANCED PRACTICE MIDWIFE

## 2020-09-14 NOTE — PROGRESS NOTES
cell carcinoma cannot be completely ruled out which was commented on by the pathologist..  Recommendation is to follow this with serial imaging. In reviewing the films appears to in its location and size that this should be able to be seen easily with renal ultrasound. If it grows we may consider rebiopsy.  S/P total hysterectomy and BSO (bilateral salpingo-oophorectomy) 10/23/2015    Midline cystocele 10/23/2015    Post-menopause on HRT (hormone replacement therapy) 10/23/2015    Blepharochalasis 08/27/2013       Return to office for 1 week       She was  counseled on her preventative health maintenance recommendations and follow-up.                Tiffany Ca,9/14/2020 11:47 AM

## 2020-09-21 ENCOUNTER — OFFICE VISIT (OUTPATIENT)
Dept: OBGYN | Age: 80
End: 2020-09-21
Payer: MEDICARE

## 2020-09-21 VITALS
BODY MASS INDEX: 24.01 KG/M2 | WEIGHT: 153 LBS | SYSTOLIC BLOOD PRESSURE: 122 MMHG | HEIGHT: 67 IN | DIASTOLIC BLOOD PRESSURE: 70 MMHG

## 2020-09-21 PROCEDURE — 1036F TOBACCO NON-USER: CPT | Performed by: ADVANCED PRACTICE MIDWIFE

## 2020-09-21 PROCEDURE — G8400 PT W/DXA NO RESULTS DOC: HCPCS | Performed by: ADVANCED PRACTICE MIDWIFE

## 2020-09-21 PROCEDURE — G8420 CALC BMI NORM PARAMETERS: HCPCS | Performed by: ADVANCED PRACTICE MIDWIFE

## 2020-09-21 PROCEDURE — 99213 OFFICE O/P EST LOW 20 MIN: CPT | Performed by: ADVANCED PRACTICE MIDWIFE

## 2020-09-21 PROCEDURE — 4040F PNEUMOC VAC/ADMIN/RCVD: CPT | Performed by: ADVANCED PRACTICE MIDWIFE

## 2020-09-21 PROCEDURE — G8427 DOCREV CUR MEDS BY ELIG CLIN: HCPCS | Performed by: ADVANCED PRACTICE MIDWIFE

## 2020-09-21 PROCEDURE — 99211 OFF/OP EST MAY X REQ PHY/QHP: CPT | Performed by: ADVANCED PRACTICE MIDWIFE

## 2020-09-21 PROCEDURE — 1090F PRES/ABSN URINE INCON ASSESS: CPT | Performed by: ADVANCED PRACTICE MIDWIFE

## 2020-09-21 PROCEDURE — 1123F ACP DISCUSS/DSCN MKR DOCD: CPT | Performed by: ADVANCED PRACTICE MIDWIFE

## 2020-09-21 NOTE — PROGRESS NOTES
PROBLEM VISIT - pessary check    Date of service: 2020    Kinsey Mendoza  Is a 79 y.o.  female    PT's PCP is: Candy Michaud MD     : 1940                                           Allergies: Bactrim [sulfamethoxazole-trimethoprim]; Levofloxacin; Penicillin g; and Sulfamethoxazole    Chief Complaint   Patient presents with    Other     pt presents here today to recheck granulation tissue of the vagina. and possibly replacing pessary        Last pap: 10/4/16     Subjective:     PE:  Vital Signs  Blood pressure 122/70, height 5' 7\" (1.702 m), weight 153 lb (69.4 kg), not currently breastfeeding. Labs:    No results found for this visit on 20. NURSE: JOSSELINE    HPI:   The patient was seen today for possible replacement of pessary if granulation tissue has healed. She denies any vaginal bleeding. She denies to any vaginal discharge or odor. Her bowels are regular and her voiding pattern is normal.     Objective:  Perineum/Speculum: There is not any signs of infection; The vaginal vault is without any signs of erythema or erosion. There is no vaginal discharge or odor appreciated. The pessary was replacedwithout any problems and the patient tolerated procedure well. Assessment:   Diagnosis Orders   1. Midline cystocele     2. Granulation tissue of vagina     3. Vaginal vault prolapse after hysterectomy         Patient Active Problem List    Diagnosis Date Noted    History of adenomatous polyp of colon 10/29/2018    Hyperlipidemia 2018    Slow transit constipation 2018    Anxiety 2017    History of breast cancer 2017    Normocytic anemia 2017    Renal oncocytoma of left kidney 2017     Overview:   Left 2.6 centimeter enhancing renal mass  Biopsy 2017 demonstrating oncocytoma    I explained to the patient and her  that renal oncocytomas are benign.   There are some renal cell carcinomas that can have oncocytic features and because of this renal cell carcinoma cannot be completely ruled out which was commented on by the pathologist..  Recommendation is to follow this with serial imaging. In reviewing the films appears to in its location and size that this should be able to be seen easily with renal ultrasound. If it grows we may consider rebiopsy.  S/P total hysterectomy and BSO (bilateral salpingo-oophorectomy) 10/23/2015    Midline cystocele 10/23/2015    Post-menopause on HRT (hormone replacement therapy) 10/23/2015    Blepharochalasis 08/27/2013           Plan:  1. Return to the office 8 weeks  2. Report any vaginal bleeding or discharge  3. Abstinence         She was  counseled on her preventative health maintenance recommendations and follow-up.              FF time: 15 min    Rinaldo Moritz Pool,9/21/2020 2:00 PM

## 2020-12-14 ENCOUNTER — OFFICE VISIT (OUTPATIENT)
Dept: OBGYN | Age: 80
End: 2020-12-14
Payer: MEDICARE

## 2020-12-14 VITALS
RESPIRATION RATE: 18 BRPM | DIASTOLIC BLOOD PRESSURE: 78 MMHG | HEART RATE: 72 BPM | SYSTOLIC BLOOD PRESSURE: 128 MMHG | WEIGHT: 154 LBS | BODY MASS INDEX: 24.12 KG/M2

## 2020-12-14 PROBLEM — C83.10 MANTLE CELL LYMPHOMA (HCC): Status: ACTIVE | Noted: 2020-11-13

## 2020-12-14 PROBLEM — R92.1 BREAST CALCIFICATIONS ON MAMMOGRAM: Status: ACTIVE | Noted: 2020-10-27

## 2020-12-14 PROBLEM — R16.1 SPLENOMEGALY: Status: ACTIVE | Noted: 2020-10-09

## 2020-12-14 PROCEDURE — 99211 OFF/OP EST MAY X REQ PHY/QHP: CPT | Performed by: ADVANCED PRACTICE MIDWIFE

## 2020-12-14 PROCEDURE — 1036F TOBACCO NON-USER: CPT | Performed by: ADVANCED PRACTICE MIDWIFE

## 2020-12-14 PROCEDURE — G8420 CALC BMI NORM PARAMETERS: HCPCS | Performed by: ADVANCED PRACTICE MIDWIFE

## 2020-12-14 PROCEDURE — G8400 PT W/DXA NO RESULTS DOC: HCPCS | Performed by: ADVANCED PRACTICE MIDWIFE

## 2020-12-14 PROCEDURE — 99213 OFFICE O/P EST LOW 20 MIN: CPT | Performed by: ADVANCED PRACTICE MIDWIFE

## 2020-12-14 PROCEDURE — G8427 DOCREV CUR MEDS BY ELIG CLIN: HCPCS | Performed by: ADVANCED PRACTICE MIDWIFE

## 2020-12-14 PROCEDURE — 1090F PRES/ABSN URINE INCON ASSESS: CPT | Performed by: ADVANCED PRACTICE MIDWIFE

## 2020-12-14 PROCEDURE — 1123F ACP DISCUSS/DSCN MKR DOCD: CPT | Performed by: ADVANCED PRACTICE MIDWIFE

## 2020-12-14 PROCEDURE — 4040F PNEUMOC VAC/ADMIN/RCVD: CPT | Performed by: ADVANCED PRACTICE MIDWIFE

## 2020-12-14 PROCEDURE — G8484 FLU IMMUNIZE NO ADMIN: HCPCS | Performed by: ADVANCED PRACTICE MIDWIFE

## 2020-12-14 NOTE — PROGRESS NOTES
PROBLEM VISIT - pessary check    Date of service: 2020    Kim French  Is a 79 y.o.  female    PT's PCP is: Real Gordon MD     : 1940                                           Allergies: Bactrim [sulfamethoxazole-trimethoprim], Levofloxacin, Penicillin g, and Sulfamethoxazole    Chief Complaint   Patient presents with    Other     pessary maintenance         Subjective:     PE:  Vital Signs  Blood pressure 128/78, pulse 72, resp. rate 18, weight 154 lb (69.9 kg), not currently breastfeeding. Labs:    No results found for this visit on 20. HPI:   The patient was seen today for pessary cleaning. She reports any vaginal bleeding patient states occasionally she notices small smears of blood but most of the time she does not. She denies to any vaginal discharge or odor. Her bowels are regular and her voiding pattern is normal.     Patient here today for pessary maintenance however she is very tearful states that they have her  is very sick with a cancer and that they are doing [place]. But it is not really helping him and making him have a lot of pain. Patient states he has lost about 35 pounds as well. Patient states they also found on her some splenomegaly which then they found some lymphoma. Patient states at this point in time they are just watching it for her  Objective:  Perineum/:  infection; The vaginal vault is without any signs of erythema or erosion. There is no vaginal discharge or odor appreciated. The pessary was removed, cleansed and replacedwithout any problems and the patient tolerated procedure well. Assessment:   Diagnosis Orders   1.  Encounter for pessary maintenance         Patient Active Problem List    Diagnosis Date Noted    Mantle cell lymphoma ([facility] [address] 2020    Breast calcifications on mammogram 10/27/2020    Splenomegaly 10/09/2020    History of adenomatous polyp of colon 10/29/2018    Hyperlipidemia 2018    Slow transit constipation 05/01/2018    Anxiety 08/26/2017    History of breast cancer 08/26/2017    Normocytic anemia 08/26/2017    Renal oncocytoma of left kidney 08/26/2017     Overview:   Left 2.6 centimeter enhancing renal mass  Biopsy October 6, 2017 demonstrating oncocytoma    I explained to the patient and her  that renal oncocytomas are benign. There are some renal cell carcinomas that can have oncocytic features and because of this renal cell carcinoma cannot be completely ruled out which was commented on by the pathologist..  Recommendation is to follow this with serial imaging. In reviewing the films appears to in its location and size that this should be able to be seen easily with renal ultrasound. If it grows we may consider rebiopsy.  S/P total hysterectomy and BSO (bilateral salpingo-oophorectomy) 10/23/2015    Midline cystocele 10/23/2015    Post-menopause on HRT (hormone replacement therapy) 10/23/2015    Blepharochalasis 08/27/2013           Plan:  1. Return to the office 8 weeks  2. Report any vaginal bleeding or discharge  3. Abstinence         She was  counseled on her preventative health maintenance recommendations and follow-up.              FF time: 15 min    Rosalinda Ca,12/14/2020 5:35 PM

## 2021-03-04 ENCOUNTER — OFFICE VISIT (OUTPATIENT)
Dept: OBGYN | Age: 81
End: 2021-03-04
Payer: MEDICARE

## 2021-03-04 VITALS — BODY MASS INDEX: 22.4 KG/M2 | DIASTOLIC BLOOD PRESSURE: 62 MMHG | WEIGHT: 143 LBS | SYSTOLIC BLOOD PRESSURE: 112 MMHG

## 2021-03-04 DIAGNOSIS — N99.3 VAGINAL VAULT PROLAPSE AFTER HYSTERECTOMY: ICD-10-CM

## 2021-03-04 DIAGNOSIS — N81.11 MIDLINE CYSTOCELE: Primary | ICD-10-CM

## 2021-03-04 PROBLEM — Z63.4 UNCOMPLICATED BEREAVEMENT: Status: ACTIVE | Noted: 2021-02-01

## 2021-03-04 PROCEDURE — 1123F ACP DISCUSS/DSCN MKR DOCD: CPT | Performed by: ADVANCED PRACTICE MIDWIFE

## 2021-03-04 PROCEDURE — 1090F PRES/ABSN URINE INCON ASSESS: CPT | Performed by: ADVANCED PRACTICE MIDWIFE

## 2021-03-04 PROCEDURE — 4040F PNEUMOC VAC/ADMIN/RCVD: CPT | Performed by: ADVANCED PRACTICE MIDWIFE

## 2021-03-04 PROCEDURE — 99213 OFFICE O/P EST LOW 20 MIN: CPT | Performed by: ADVANCED PRACTICE MIDWIFE

## 2021-03-04 PROCEDURE — 99211 OFF/OP EST MAY X REQ PHY/QHP: CPT

## 2021-03-04 PROCEDURE — G8484 FLU IMMUNIZE NO ADMIN: HCPCS | Performed by: ADVANCED PRACTICE MIDWIFE

## 2021-03-04 PROCEDURE — G8420 CALC BMI NORM PARAMETERS: HCPCS | Performed by: ADVANCED PRACTICE MIDWIFE

## 2021-03-04 PROCEDURE — 1036F TOBACCO NON-USER: CPT | Performed by: ADVANCED PRACTICE MIDWIFE

## 2021-03-04 PROCEDURE — G8400 PT W/DXA NO RESULTS DOC: HCPCS | Performed by: ADVANCED PRACTICE MIDWIFE

## 2021-03-04 PROCEDURE — G8427 DOCREV CUR MEDS BY ELIG CLIN: HCPCS | Performed by: ADVANCED PRACTICE MIDWIFE

## 2021-03-04 RX ORDER — BUSPIRONE HYDROCHLORIDE 10 MG/1
TABLET ORAL
COMMUNITY
Start: 2021-01-06

## 2021-03-04 RX ORDER — ESCITALOPRAM OXALATE 20 MG/1
20 TABLET ORAL DAILY
COMMUNITY
Start: 2021-02-04

## 2021-03-04 RX ORDER — LORAZEPAM 1 MG/1
1 TABLET ORAL EVERY 8 HOURS PRN
COMMUNITY
Start: 2021-02-25

## 2021-03-04 ASSESSMENT — PATIENT HEALTH QUESTIONNAIRE - PHQ9: DEPRESSION UNABLE TO ASSESS: PT REFUSES

## 2021-03-04 NOTE — PROGRESS NOTES
PROBLEM VISIT - pessary check    Date of service: 3/4/2021    Al Berumen  Is a 79 y.o.  female    PT's PCP is: Margie Gustafson MD     : 1940                                           Allergies: Bactrim [sulfamethoxazole-trimethoprim], Levofloxacin, Penicillin g, and Sulfamethoxazole    Chief Complaint   Patient presents with    Other     Patient here for pessary maintenance. Pt states pt states sometimes she has spotting sometimes but it is better than it has been for a \"long time now\". Pt states no discomfort, questions, or concerns. Pt states her  has passed away recently. Last pap: 10/04/16     Subjective:     PE:  Vital Signs  Blood pressure 112/62, weight 143 lb (64.9 kg), not currently breastfeeding. Labs:    No results found for this visit on 21. NURSE: Shelia    HPI:   The patient was seen today for pessary cleaning. However we did discuss a lot about her  who passed away in December. Patient is very tearful and needs a lot of support at this point in time. Patient is seeing counselors as well. Patient states her daughters are trying to get her to go to [place] for 2 weeks and she thinks she will go. We did talk for a long time. . She denies any vaginal bleeding. She denies to any vaginal discharge or odor. Her bowels are regular and her voiding pattern is normal.     Objective:  Perineum/Speculum: There is not any signs of infection; The vaginal vault is not without any signs of erythema or erosion. There is no vaginal discharge or odor appreciated. The pessary was removed, cleansed and replacedwithout any problems and the patient tolerated procedure well. Assessment:   Diagnosis Orders   1. Midline cystocele     2.  Vaginal vault prolapse after hysterectomy         Patient Active Problem List    Diagnosis Date Noted    Uncomplicated bereavement     Mantle cell lymphoma ([facility] [address] 2020    Breast calcifications on mammogram 10/27/2020    Splenomegaly 10/09/2020    History of adenomatous polyp of colon 10/29/2018    Hyperlipidemia 05/16/2018    Slow transit constipation 05/01/2018    Anxiety 08/26/2017    History of breast cancer 08/26/2017    Normocytic anemia 08/26/2017    Renal oncocytoma of left kidney 08/26/2017     Overview:   Left 2.6 centimeter enhancing renal mass  Biopsy October 6, 2017 demonstrating oncocytoma    I explained to the patient and her  that renal oncocytomas are benign. There are some renal cell carcinomas that can have oncocytic features and because of this renal cell carcinoma cannot be completely ruled out which was commented on by the pathologist..  Recommendation is to follow this with serial imaging. In reviewing the films appears to in its location and size that this should be able to be seen easily with renal ultrasound. If it grows we may consider rebiopsy.  S/P total hysterectomy and BSO (bilateral salpingo-oophorectomy) 10/23/2015    Midline cystocele 10/23/2015    Post-menopause on HRT (hormone replacement therapy) 10/23/2015    Blepharochalasis 08/27/2013           Plan:  1. Return to the office 8 weeks  2. Report any vaginal bleeding or discharge  3. Abstinence         She was  counseled on her preventative health maintenance recommendations and follow-up.                  Chely Ca,3/4/2021 9:56 AM

## 2021-05-10 ENCOUNTER — OFFICE VISIT (OUTPATIENT)
Dept: OBGYN | Age: 81
End: 2021-05-10
Payer: MEDICARE

## 2021-05-10 VITALS
BODY MASS INDEX: 21.5 KG/M2 | DIASTOLIC BLOOD PRESSURE: 68 MMHG | HEIGHT: 67 IN | WEIGHT: 137 LBS | SYSTOLIC BLOOD PRESSURE: 100 MMHG

## 2021-05-10 DIAGNOSIS — N81.11 MIDLINE CYSTOCELE: Primary | ICD-10-CM

## 2021-05-10 DIAGNOSIS — N89.8 GRANULATION TISSUE OF VAGINA: ICD-10-CM

## 2021-05-10 PROCEDURE — 17250 CHEM CAUT OF GRANLTJ TISSUE: CPT | Performed by: ADVANCED PRACTICE MIDWIFE

## 2021-05-10 PROCEDURE — 4040F PNEUMOC VAC/ADMIN/RCVD: CPT | Performed by: ADVANCED PRACTICE MIDWIFE

## 2021-05-10 PROCEDURE — G8420 CALC BMI NORM PARAMETERS: HCPCS | Performed by: ADVANCED PRACTICE MIDWIFE

## 2021-05-10 PROCEDURE — 1123F ACP DISCUSS/DSCN MKR DOCD: CPT | Performed by: ADVANCED PRACTICE MIDWIFE

## 2021-05-10 PROCEDURE — G8400 PT W/DXA NO RESULTS DOC: HCPCS | Performed by: ADVANCED PRACTICE MIDWIFE

## 2021-05-10 PROCEDURE — 1036F TOBACCO NON-USER: CPT | Performed by: ADVANCED PRACTICE MIDWIFE

## 2021-05-10 PROCEDURE — 99213 OFFICE O/P EST LOW 20 MIN: CPT | Performed by: ADVANCED PRACTICE MIDWIFE

## 2021-05-10 PROCEDURE — 1090F PRES/ABSN URINE INCON ASSESS: CPT | Performed by: ADVANCED PRACTICE MIDWIFE

## 2021-05-10 PROCEDURE — G8427 DOCREV CUR MEDS BY ELIG CLIN: HCPCS | Performed by: ADVANCED PRACTICE MIDWIFE

## 2021-05-10 RX ORDER — ALLOPURINOL 300 MG/1
TABLET ORAL
COMMUNITY
Start: 2021-04-08

## 2021-05-10 ASSESSMENT — PATIENT HEALTH QUESTIONNAIRE - PHQ9
SUM OF ALL RESPONSES TO PHQ9 QUESTIONS 1 & 2: 2
SUM OF ALL RESPONSES TO PHQ QUESTIONS 1-9: 2
SUM OF ALL RESPONSES TO PHQ QUESTIONS 1-9: 2
1. LITTLE INTEREST OR PLEASURE IN DOING THINGS: 1

## 2021-05-10 NOTE — PROGRESS NOTES
PROBLEM VISIT - pessary check    Date of service: 5/10/2021    Chayo Burleson  Is a 79 y.o.  female    PT's PCP is: Mata Cope MD     : 1940                                           Allergies: Bactrim [sulfamethoxazole-trimethoprim], Levofloxacin, Penicillin g, and Sulfamethoxazole    Chief Complaint   Patient presents with    Follow-up     Patient is being seen for pessary maintence. she has been under a lot of stress due to loss of her  and lymphoma treatment.          Last pap: 10/04/2016  Normal     Nurse:SERA DUMONT
some renal cell carcinomas that can have oncocytic features and because of this renal cell carcinoma cannot be completely ruled out which was commented on by the pathologist..  Recommendation is to follow this with serial imaging. In reviewing the films appears to in its location and size that this should be able to be seen easily with renal ultrasound. If it grows we may consider rebiopsy.  S/P total hysterectomy and BSO (bilateral salpingo-oophorectomy) 10/23/2015    Midline cystocele 10/23/2015    Post-menopause on HRT (hormone replacement therapy) 10/23/2015    Blepharochalasis 08/27/2013     Procedure  Vaginal vault granulation tissue noted with speculum exam  Silver nitrate sticks applied to granulation tissue  Patient tolerated procedure well      Plan:  1. Return to the office 1 weeks  2. Report any vaginal bleeding or discharge  3. Abstinence         She was  counseled on her preventative health maintenance recommendations and follow-up.              FF time: 15 min    Monica Ca,5/10/2021 4:52 PM

## 2021-05-25 ENCOUNTER — OFFICE VISIT (OUTPATIENT)
Dept: OBGYN | Age: 81
End: 2021-05-25
Payer: MEDICARE

## 2021-05-25 VITALS
SYSTOLIC BLOOD PRESSURE: 130 MMHG | HEIGHT: 67 IN | DIASTOLIC BLOOD PRESSURE: 84 MMHG | BODY MASS INDEX: 21.82 KG/M2 | WEIGHT: 139 LBS

## 2021-05-25 DIAGNOSIS — N89.8 GRANULATION TISSUE AT VAGINAL VAULT: ICD-10-CM

## 2021-05-25 DIAGNOSIS — Z46.89 PESSARY MAINTENANCE: Primary | ICD-10-CM

## 2021-05-25 PROCEDURE — 4040F PNEUMOC VAC/ADMIN/RCVD: CPT | Performed by: ADVANCED PRACTICE MIDWIFE

## 2021-05-25 PROCEDURE — G8420 CALC BMI NORM PARAMETERS: HCPCS | Performed by: ADVANCED PRACTICE MIDWIFE

## 2021-05-25 PROCEDURE — G8427 DOCREV CUR MEDS BY ELIG CLIN: HCPCS | Performed by: ADVANCED PRACTICE MIDWIFE

## 2021-05-25 PROCEDURE — 1036F TOBACCO NON-USER: CPT | Performed by: ADVANCED PRACTICE MIDWIFE

## 2021-05-25 PROCEDURE — G8400 PT W/DXA NO RESULTS DOC: HCPCS | Performed by: ADVANCED PRACTICE MIDWIFE

## 2021-05-25 PROCEDURE — 1123F ACP DISCUSS/DSCN MKR DOCD: CPT | Performed by: ADVANCED PRACTICE MIDWIFE

## 2021-05-25 PROCEDURE — 99212 OFFICE O/P EST SF 10 MIN: CPT | Performed by: ADVANCED PRACTICE MIDWIFE

## 2021-05-25 PROCEDURE — 1090F PRES/ABSN URINE INCON ASSESS: CPT | Performed by: ADVANCED PRACTICE MIDWIFE

## 2021-05-25 PROCEDURE — 99211 OFF/OP EST MAY X REQ PHY/QHP: CPT | Performed by: ADVANCED PRACTICE MIDWIFE

## 2021-05-25 NOTE — PROGRESS NOTES
(LIPITOR) 10 MG tablet, Take 10 mg by mouth daily (Patient not taking: Reported on 5/25/2021), Disp: , Rfl:     Social History     Substance and Sexual Activity   Sexual Activity Not Currently       Last Yearly:  10/2016    Last pap: 10/2016    Last HPV: known    Have you had a positive covid test: No    Have you had the covid immunization: Yes    Chief Complaint   Patient presents with    Incontinence     Pessary check. Patient has not used pessary for last 2 weeks due to bleeding. PE:  Vital Signs  Blood pressure 130/84, height 5' 7\" (1.702 m), weight 139 lb (63 kg), not currently breastfeeding. Estimated body mass index is 21.77 kg/m² as calculated from the following:    Height as of this encounter: 5' 7\" (1.702 m). Weight as of this encounter: 139 lb (63 kg). No data recorded    NURSE: Shade ESTRELLA    HPI: here for possible replacement of pessary     PT denies fever, chills, nausea and vomiting       Objective   No acute distress  Excellent communications  Well-nourished   Pelvic Exam: speculum exam, tissue well healed in vaginal vault, no blood present in vault. gelhorn pessary replaced - patient tolerated procedure well                        Results reviewed today:    No results found for this visit on 05/25/21. Assessment and Plan          Diagnosis Orders   1. Pessary maintenance     2. Granulation tissue at vaginal vault               I am having Grace Grandchild maintain her hydrOXYzine, vitamin D, atorvastatin, doxycycline hyclate, estradiol, oxyquinolone, busPIRone, escitalopram, LORazepam, and allopurinol. Return in about 8 weeks (around 7/20/2021), or pessary cleaning with Arsenio Kern. There are no Patient Instructions on file for this visit. Over 50% of time spent on counseling and care coordination on: see assessment and plan,  She was also counseled on her preventative health maintenance recommendations and follow-up.         FF time: 15 min      LAWRENCE Kennedy - HARJIT,5/25/2021 2:07 PM

## 2021-06-29 DIAGNOSIS — Z46.89 PESSARY MAINTENANCE: ICD-10-CM

## 2021-06-29 DIAGNOSIS — N95.2 VAGINAL ATROPHY: ICD-10-CM

## 2021-06-29 RX ORDER — OXYQUINOLINE SULFATE AND SODIUM LAURYL SULFATE .25; .1 MG/G; MG/G
JELLY VAGINAL
Qty: 113.4 G | Refills: 6 | Status: SHIPPED | OUTPATIENT
Start: 2021-06-29 | End: 2022-08-29 | Stop reason: SDUPTHER

## 2021-10-20 DIAGNOSIS — N95.2 VAGINAL ATROPHY: ICD-10-CM

## 2021-10-20 RX ORDER — ESTRADIOL 0.1 MG/G
0.25 CREAM VAGINAL
Qty: 42.5 G | Refills: 5 | Status: SHIPPED | OUTPATIENT
Start: 2021-10-21

## 2021-11-16 ENCOUNTER — OFFICE VISIT (OUTPATIENT)
Dept: OBGYN | Age: 81
End: 2021-11-16
Payer: MEDICARE

## 2021-11-16 VITALS
WEIGHT: 146 LBS | DIASTOLIC BLOOD PRESSURE: 60 MMHG | BODY MASS INDEX: 22.91 KG/M2 | SYSTOLIC BLOOD PRESSURE: 118 MMHG | HEIGHT: 67 IN

## 2021-11-16 DIAGNOSIS — N89.8 GRANULATION TISSUE OF VAGINA: Primary | ICD-10-CM

## 2021-11-16 DIAGNOSIS — N99.3 VAGINAL VAULT PROLAPSE AFTER HYSTERECTOMY: ICD-10-CM

## 2021-11-16 PROCEDURE — 17250 CHEM CAUT OF GRANLTJ TISSUE: CPT | Performed by: ADVANCED PRACTICE MIDWIFE

## 2021-11-16 NOTE — PROGRESS NOTES
PROBLEM VISIT - pessary check    Date of service: 2021    Anival Fisher  Is a [de-identified] y.o.  female    PT's PCP is: Julia Da Silva MD     : 1940                                           Allergies: Bactrim [sulfamethoxazole-trimethoprim], Levofloxacin, Penicillin g, and Sulfamethoxazole    Chief Complaint   Patient presents with    Other     pt presents here today for pessary maintenance, pt denies any bleeding        Last pap: 10/4/16     Subjective:     PE:  Vital Signs  Blood pressure 118/60, height 5' 7\" (1.702 m), weight 146 lb (66.2 kg), not currently breastfeeding. Labs:    No results found for this visit on 21. NURSE: JOSSELINE    HPI:   The patient was seen today for pessary maintenance. She reports last time she has had no bleeding however last week when she placed her cream she did notice some red bleeding. She denies to any vaginal discharge or odor. Her bowels are regular and her voiding pattern is normal.     Objective:  Perineum/Speculum: There is not any signs of infection; The vaginal vault is not without any signs of erythema or erosion. There is no vaginal discharge or odor appreciated. The pessary was Removed cleansed but not replacedwithout any problems and the patient tolerated procedure well. Procedure  Speculum placed  Granulation tissue noted between the 11:00 and 2:00 anterior portion of the vaginal vault  Silver nitrate sticks utilized and applied to granulation tissue area    Assessment:   Diagnosis Orders   1. Granulation tissue of vagina  [place] CHEMICAL CAUTERIZATION OF GRANULATION TISSUE   2.  Vaginal vault prolapse after hysterectomy         Patient Active Problem List    Diagnosis Date Noted    Uncomplicated bereavement     Mantle cell lymphoma ([facility] [address] 2020    Breast calcifications on mammogram 10/27/2020    Splenomegaly 10/09/2020    History of adenomatous polyp of colon 10/29/2018    Hyperlipidemia 2018    Slow transit constipation 05/01/2018    Anxiety 08/26/2017    History of breast cancer 08/26/2017    Normocytic anemia 08/26/2017    Renal oncocytoma of left kidney 08/26/2017     Overview:   Left 2.6 centimeter enhancing renal mass  Biopsy October 6, 2017 demonstrating oncocytoma    I explained to the patient and her  that renal oncocytomas are benign. There are some renal cell carcinomas that can have oncocytic features and because of this renal cell carcinoma cannot be completely ruled out which was commented on by the pathologist..  Recommendation is to follow this with serial imaging. In reviewing the films appears to in its location and size that this should be able to be seen easily with renal ultrasound. If it grows we may consider rebiopsy.  S/P total hysterectomy and BSO (bilateral salpingo-oophorectomy) 10/23/2015    Midline cystocele 10/23/2015    Post-menopause on HRT (hormone replacement therapy) 10/23/2015    Blepharochalasis 08/27/2013           Plan:  1. Return to the office 8 weeks  2. Report any vaginal bleeding or discharge  3. Abstinence         She was  counseled on her preventative health maintenance recommendations and follow-up.                LAWRENCE Sumner CNM,11/16/2021 3:38 PM

## 2021-11-22 ENCOUNTER — OFFICE VISIT (OUTPATIENT)
Dept: OBGYN | Age: 81
End: 2021-11-22
Payer: MEDICARE

## 2021-11-22 VITALS
HEIGHT: 67 IN | WEIGHT: 147 LBS | SYSTOLIC BLOOD PRESSURE: 127 MMHG | DIASTOLIC BLOOD PRESSURE: 72 MMHG | BODY MASS INDEX: 23.07 KG/M2

## 2021-11-22 DIAGNOSIS — N99.3 VAGINAL VAULT PROLAPSE AFTER HYSTERECTOMY: Primary | ICD-10-CM

## 2021-11-22 DIAGNOSIS — Z46.89 PESSARY MAINTENANCE: ICD-10-CM

## 2021-11-22 PROCEDURE — 1090F PRES/ABSN URINE INCON ASSESS: CPT | Performed by: ADVANCED PRACTICE MIDWIFE

## 2021-11-22 PROCEDURE — 99211 OFF/OP EST MAY X REQ PHY/QHP: CPT

## 2021-11-22 PROCEDURE — 1123F ACP DISCUSS/DSCN MKR DOCD: CPT | Performed by: ADVANCED PRACTICE MIDWIFE

## 2021-11-22 PROCEDURE — G8484 FLU IMMUNIZE NO ADMIN: HCPCS | Performed by: ADVANCED PRACTICE MIDWIFE

## 2021-11-22 PROCEDURE — 1036F TOBACCO NON-USER: CPT | Performed by: ADVANCED PRACTICE MIDWIFE

## 2021-11-22 PROCEDURE — G8400 PT W/DXA NO RESULTS DOC: HCPCS | Performed by: ADVANCED PRACTICE MIDWIFE

## 2021-11-22 PROCEDURE — 4040F PNEUMOC VAC/ADMIN/RCVD: CPT | Performed by: ADVANCED PRACTICE MIDWIFE

## 2021-11-22 PROCEDURE — G8427 DOCREV CUR MEDS BY ELIG CLIN: HCPCS | Performed by: ADVANCED PRACTICE MIDWIFE

## 2021-11-22 PROCEDURE — G8420 CALC BMI NORM PARAMETERS: HCPCS | Performed by: ADVANCED PRACTICE MIDWIFE

## 2021-11-22 PROCEDURE — 99213 OFFICE O/P EST LOW 20 MIN: CPT | Performed by: ADVANCED PRACTICE MIDWIFE

## 2021-11-22 NOTE — PROGRESS NOTES
PROBLEM VISIT - pessary check    Date of service: 2021    Salbador Douglass  Is a [de-identified] y.o.  female    PT's PCP is: Sage Michael MD     : 1940                                           Allergies: Bactrim [sulfamethoxazole-trimethoprim], Levofloxacin, Penicillin g, and Sulfamethoxazole    Chief Complaint   Patient presents with    Other     check graulation tissue and possibly replace pessary. pt did have vaginal bleeding after her last appt for about 2 or 3 days but it has since stopped        Last pap: 10/4/16     Subjective:     PE:  Vital Signs  Blood pressure 127/72, height 5' 7\" (1.702 m), weight 147 lb (66.7 kg), not currently breastfeeding. Labs:    No results found for this visit on 21. NURSE: JOSSELINE    HPI:   The patient was seen today for granulation check and pessary placement. She reports vaginal bleeding for couple days after her last visit however it has subsided. She denies to any vaginal discharge or odor. Her bowels are regular and her voiding pattern is normal.     Objective:  Perineum/Speculum: There is not any signs of infection; The vaginal vault is without any signs of erythema or erosion. There is no vaginal discharge or odor appreciated. The pessary was replacedwithout any problems and the patient tolerated procedure well. Assessment:   Diagnosis Orders   1. Vaginal vault prolapse after hysterectomy     2.  Pessary maintenance         Patient Active Problem List    Diagnosis Date Noted    Uncomplicated bereavement 91/10/1388    Mantle cell lymphoma ([facility] [address] 2020    Breast calcifications on mammogram 10/27/2020    Splenomegaly 10/09/2020    History of adenomatous polyp of colon 10/29/2018    Hyperlipidemia 2018    Slow transit constipation 2018    Anxiety 2017    History of breast cancer 2017    Normocytic anemia 2017    Renal oncocytoma of left kidney 2017     Overview:   Left 2.6 centimeter enhancing renal mass  Biopsy October 6, 2017 demonstrating oncocytoma    I explained to the patient and her  that renal oncocytomas are benign. There are some renal cell carcinomas that can have oncocytic features and because of this renal cell carcinoma cannot be completely ruled out which was commented on by the pathologist..  Recommendation is to follow this with serial imaging. In reviewing the films appears to in its location and size that this should be able to be seen easily with renal ultrasound. If it grows we may consider rebiopsy.  S/P total hysterectomy and BSO (bilateral salpingo-oophorectomy) 10/23/2015    Midline cystocele 10/23/2015    Post-menopause on HRT (hormone replacement therapy) 10/23/2015    Blepharochalasis 08/27/2013           Plan:  1. Return to the office 8 weeks  2. Report any vaginal bleeding or discharge  3. Abstinence         She was  counseled on her preventative health maintenance recommendations and follow-up.                LAWRENCE Judd CNM,11/22/2021 4:25 PM

## 2022-02-09 ENCOUNTER — OFFICE VISIT (OUTPATIENT)
Dept: OBGYN | Age: 82
End: 2022-02-09
Payer: MEDICARE

## 2022-02-09 VITALS
DIASTOLIC BLOOD PRESSURE: 62 MMHG | WEIGHT: 150 LBS | SYSTOLIC BLOOD PRESSURE: 120 MMHG | HEIGHT: 67 IN | BODY MASS INDEX: 23.54 KG/M2

## 2022-02-09 DIAGNOSIS — N99.3 VAGINAL VAULT PROLAPSE AFTER HYSTERECTOMY: Primary | ICD-10-CM

## 2022-02-09 DIAGNOSIS — N93.9 VAGINA BLEEDING: ICD-10-CM

## 2022-02-09 DIAGNOSIS — N89.8 GRANULATION TISSUE AT VAGINAL VAULT: ICD-10-CM

## 2022-02-09 PROBLEM — R32 URINARY INCONTINENCE: Status: ACTIVE | Noted: 2022-02-09

## 2022-02-09 PROBLEM — K46.9 ENTEROCELE: Status: ACTIVE | Noted: 2022-02-09

## 2022-02-09 PROBLEM — N81.4 UTERINE PROLAPSE: Status: ACTIVE | Noted: 2022-02-09

## 2022-02-09 PROBLEM — N39.41 URGE INCONTINENCE OF URINE: Status: ACTIVE | Noted: 2022-02-09

## 2022-02-09 PROBLEM — R35.0 FREQUENCY OF URINATION: Status: ACTIVE | Noted: 2022-02-09

## 2022-02-09 PROBLEM — R33.9 RETENTION OF URINE: Status: ACTIVE | Noted: 2022-02-09

## 2022-02-09 PROBLEM — D69.6 THROMBOCYTOPENIA (HCC): Status: ACTIVE | Noted: 2021-11-19

## 2022-02-09 PROCEDURE — 17250 CHEM CAUT OF GRANLTJ TISSUE: CPT | Performed by: ADVANCED PRACTICE MIDWIFE

## 2022-02-09 NOTE — PROGRESS NOTES
PROBLEM VISIT - pessary check    Date of service: 2022    Vanessa Sanders  Is a [de-identified] y.o.  female    PT's PCP is: Julio Cesar Lujan MD     : 1940                                           Allergies: Bactrim [sulfamethoxazole-trimethoprim], Levofloxacin, Penicillin g, and Sulfamethoxazole    Chief Complaint   Patient presents with    Other     pt here for pessary maintenance. pt states she is having bleeding        Last pap: 10/4/16     Subjective:     PE:  Vital Signs  Blood pressure 120/62, height 5' 7\" (1.702 m), weight 150 lb (68 kg), not currently breastfeeding. Labs:    No results found for this visit on 22. NURSE: JOSSELINE    HPI:   The patient was seen today for cleaning and maintenance. Patient states that when she has a hard bowel movement she does note bright red vaginal bleeding. She admits to any vaginal bleeding. She denies to any vaginal discharge or odor. Her bowels are regular and her voiding pattern is normal.  In about 2 weeks patient is planning to go to [place]. She would like this replaced before the    Objective:  Perineum/Speculum: There is not any signs of infection; The vaginal vault is  signs of granulation tissue very mild There is no vaginal discharge or odor appreciated. The pessary was removed, cleansed and replacedwithout any problems and the patient tolerated procedure well. Assessment:   Diagnosis Orders   1. Vaginal vault prolapse after hysterectomy     2. Vagina bleeding     3.  Granulation tissue at vaginal vault  [place] CHEMICAL CAUTERIZATION OF GRANULATION TISSUE       Patient Active Problem List    Diagnosis Date Noted    Uterine prolapse 2022    Enterocele 2022    Frequency of urination 2022    Retention of urine 2022    Urge incontinence of urine 2022    Urinary incontinence 2022    Thrombocytopenia ([facility] [address]     Uncomplicated bereavement     Mantle cell lymphoma ([facility] [address] 2020    Breast calcifications on mammogram 10/27/2020    Splenomegaly 10/09/2020    History of adenomatous polyp of colon 10/29/2018    Hyperlipidemia 05/16/2018    Slow transit constipation 05/01/2018    Anxiety 08/26/2017    History of breast cancer 08/26/2017    Normocytic anemia 08/26/2017    Renal oncocytoma of left kidney 08/26/2017     Overview:   Left 2.6 centimeter enhancing renal mass  Biopsy October 6, 2017 demonstrating oncocytoma    I explained to the patient and her  that renal oncocytomas are benign. There are some renal cell carcinomas that can have oncocytic features and because of this renal cell carcinoma cannot be completely ruled out which was commented on by the pathologist..  Recommendation is to follow this with serial imaging. In reviewing the films appears to in its location and size that this should be able to be seen easily with renal ultrasound. If it grows we may consider rebiopsy.  S/P total hysterectomy and BSO (bilateral salpingo-oophorectomy) 10/23/2015    Midline cystocele 10/23/2015    Post-menopause on HRT (hormone replacement therapy) 10/23/2015    Blepharochalasis 08/27/2013     Granulated patient tissue noted where the rubbing from the pessary would be in the anterior portion of the vaginal vault slightly to the left and slightly to the right no posterior irritation noted    Silver nitrate was applied to all areas      Plan:  1. Return to the office 8 weeks  2. Report any vaginal bleeding or discharge  3. Abstinence         She was  counseled on her preventative health maintenance recommendations and follow-up.                  [address] 12:44 PM

## 2022-02-17 ENCOUNTER — PROCEDURE VISIT (OUTPATIENT)
Dept: OBGYN | Age: 82
End: 2022-02-17
Payer: MEDICARE

## 2022-02-17 VITALS
WEIGHT: 149 LBS | DIASTOLIC BLOOD PRESSURE: 78 MMHG | SYSTOLIC BLOOD PRESSURE: 122 MMHG | HEIGHT: 67 IN | BODY MASS INDEX: 23.39 KG/M2

## 2022-02-17 DIAGNOSIS — N89.8 GRANULATION TISSUE AT VAGINAL VAULT: ICD-10-CM

## 2022-02-17 DIAGNOSIS — N99.3 VAGINAL VAULT PROLAPSE AFTER HYSTERECTOMY: Primary | ICD-10-CM

## 2022-02-17 PROCEDURE — 4040F PNEUMOC VAC/ADMIN/RCVD: CPT | Performed by: ADVANCED PRACTICE MIDWIFE

## 2022-02-17 PROCEDURE — 1036F TOBACCO NON-USER: CPT | Performed by: ADVANCED PRACTICE MIDWIFE

## 2022-02-17 PROCEDURE — 99211 OFF/OP EST MAY X REQ PHY/QHP: CPT

## 2022-02-17 PROCEDURE — 99212 OFFICE O/P EST SF 10 MIN: CPT | Performed by: ADVANCED PRACTICE MIDWIFE

## 2022-02-17 PROCEDURE — G8427 DOCREV CUR MEDS BY ELIG CLIN: HCPCS | Performed by: ADVANCED PRACTICE MIDWIFE

## 2022-02-17 PROCEDURE — G8484 FLU IMMUNIZE NO ADMIN: HCPCS | Performed by: ADVANCED PRACTICE MIDWIFE

## 2022-02-17 PROCEDURE — 1090F PRES/ABSN URINE INCON ASSESS: CPT | Performed by: ADVANCED PRACTICE MIDWIFE

## 2022-02-17 PROCEDURE — 1123F ACP DISCUSS/DSCN MKR DOCD: CPT | Performed by: ADVANCED PRACTICE MIDWIFE

## 2022-02-17 PROCEDURE — G8400 PT W/DXA NO RESULTS DOC: HCPCS | Performed by: ADVANCED PRACTICE MIDWIFE

## 2022-02-17 PROCEDURE — G8420 CALC BMI NORM PARAMETERS: HCPCS | Performed by: ADVANCED PRACTICE MIDWIFE

## 2022-02-17 NOTE — PROGRESS NOTES
PROBLEM VISIT     Date of service: 2022    Vanessa Sanders  Is a [de-identified] y.o.  female    PT's PCP is: Julio Cesar Lujan MD     : 1940                                             Subjective:       No LMP recorded (lmp unknown). Patient has had a hysterectomy. OB History    Para Term  AB Living   3 3 3     3   SAB IAB Ectopic Molar Multiple Live Births             3      # Outcome Date GA Lbr Chapo/2nd Weight Sex Delivery Anes PTL Lv   3 Term 73 40w0d  6 lb (2.722 kg) F Vag-Spont   NAGA   2 Term 70 40w0d  5 lb (2.268 kg) F Vag-Spont   NAGA   1 Term 11/15/64 40w0d  7 lb 0.5 oz (3.189 kg) M Vag-Spont   NAGA        Social History     Tobacco Use   Smoking Status Never Smoker   Smokeless Tobacco Never Used        Social History     Substance and Sexual Activity   Alcohol Use No    Alcohol/week: 0.0 standard drinks       Social History     Substance and Sexual Activity   Sexual Activity Not Currently    Partners: Male       Allergies: Bactrim [sulfamethoxazole-trimethoprim], Levofloxacin, Penicillin g, and Sulfamethoxazole    Chief Complaint   Patient presents with    Follow-up     Patient is being seen to have pessary placed. She notes that she has been feeling better and denies any vaginal bleeding at this time. Last Yearly:  2016    Last pap: 2016    Last HPV:     Have you had a positive covid test: No    Have you had the covid immunization: Yes    PE:  Vital Signs  Blood pressure 122/78, height 5' 7\" (1.702 m), weight 149 lb (67.6 kg), not currently breastfeeding. Estimated body mass index is 23.34 kg/m² as calculated from the following:    Height as of this encounter: 5' 7\" (1.702 m). Weight as of this encounter: 149 lb (67.6 kg). No data recorded      NURSE: Irina DUMONT      HPI: Here today to replace pessary this was treated with silver nitrate on the granulation tissue at the last visit.       Yes  PT denies fever, chills, nausea and vomiting Objective:   Vaginal canal  Granulation tissue is healing however there is still some slight irritation in the posterior fornix where the pessary would be applied patient                               Assessment and Plan          Diagnosis Orders   1. Vaginal vault prolapse after hysterectomy     2. Granulation tissue at vaginal vault       states she is flying out from [place] and cannot be without her pessary patient understands risks versus benefits at this point we have replaced the pessary however as soon as she gets back from [place] we will remove it with continued treatment          I am having Maria Teresa Fox maintain her hydrOXYzine, vitamin D, atorvastatin, doxycycline hyclate, busPIRone, escitalopram, LORazepam, allopurinol, Trimo-Phan, and estradiol. No follow-ups on file. She was also counseled on her preventative health maintenance recommendations and follow-up. There are no Patient Instructions on file for this visit.     LAWRENCE Ennis CNM,2/17/2022 4:21 PM

## 2022-03-24 ENCOUNTER — OFFICE VISIT (OUTPATIENT)
Dept: OBGYN | Age: 82
End: 2022-03-24
Payer: MEDICARE

## 2022-03-24 VITALS
WEIGHT: 151 LBS | DIASTOLIC BLOOD PRESSURE: 76 MMHG | HEIGHT: 67 IN | BODY MASS INDEX: 23.7 KG/M2 | SYSTOLIC BLOOD PRESSURE: 118 MMHG

## 2022-03-24 DIAGNOSIS — N81.11 MIDLINE CYSTOCELE: ICD-10-CM

## 2022-03-24 DIAGNOSIS — N81.4 UTERINE PROLAPSE: Primary | ICD-10-CM

## 2022-03-24 DIAGNOSIS — N89.8 GRANULATION TISSUE AT VAGINAL VAULT: ICD-10-CM

## 2022-03-24 PROCEDURE — G8400 PT W/DXA NO RESULTS DOC: HCPCS | Performed by: ADVANCED PRACTICE MIDWIFE

## 2022-03-24 PROCEDURE — 1036F TOBACCO NON-USER: CPT | Performed by: ADVANCED PRACTICE MIDWIFE

## 2022-03-24 PROCEDURE — 17250 CHEM CAUT OF GRANLTJ TISSUE: CPT | Performed by: ADVANCED PRACTICE MIDWIFE

## 2022-03-24 PROCEDURE — G8420 CALC BMI NORM PARAMETERS: HCPCS | Performed by: ADVANCED PRACTICE MIDWIFE

## 2022-03-24 PROCEDURE — 1123F ACP DISCUSS/DSCN MKR DOCD: CPT | Performed by: ADVANCED PRACTICE MIDWIFE

## 2022-03-24 PROCEDURE — 4040F PNEUMOC VAC/ADMIN/RCVD: CPT | Performed by: ADVANCED PRACTICE MIDWIFE

## 2022-03-24 PROCEDURE — 1090F PRES/ABSN URINE INCON ASSESS: CPT | Performed by: ADVANCED PRACTICE MIDWIFE

## 2022-03-24 PROCEDURE — G8484 FLU IMMUNIZE NO ADMIN: HCPCS | Performed by: ADVANCED PRACTICE MIDWIFE

## 2022-03-24 PROCEDURE — 99213 OFFICE O/P EST LOW 20 MIN: CPT | Performed by: ADVANCED PRACTICE MIDWIFE

## 2022-03-24 PROCEDURE — G8427 DOCREV CUR MEDS BY ELIG CLIN: HCPCS | Performed by: ADVANCED PRACTICE MIDWIFE

## 2022-03-24 NOTE — PROGRESS NOTES
PROBLEM VISIT     Date of service: 3/24/2022    Sánchez Corona  Is a [de-identified] y.o.  female    PT's PCP is: Max Damian MD     : 1940                                             Subjective:       No LMP recorded (lmp unknown). Patient has had a hysterectomy. OB History    Para Term  AB Living   3 3 3     3   SAB IAB Ectopic Molar Multiple Live Births             3      # Outcome Date GA Lbr Chapo/2nd Weight Sex Delivery Anes PTL Lv   3 Term 73 40w0d  6 lb (2.722 kg) F Vag-Spont   NAGA   2 Term 70 40w0d  5 lb (2.268 kg) F Vag-Spont   NAGA   1 Term 11/15/64 40w0d  7 lb 0.5 oz (3.189 kg) M Vag-Spont   NAGA        Social History     Tobacco Use   Smoking Status Never Smoker   Smokeless Tobacco Never Used        Social History     Substance and Sexual Activity   Alcohol Use No    Alcohol/week: 0.0 standard drinks       Allergies: Bactrim [sulfamethoxazole-trimethoprim], Levofloxacin, Penicillin g, and Sulfamethoxazole      Current Outpatient Medications:     estradiol (ESTRACE) 0.1 MG/GM vaginal cream, PLACE 0.25 G VAGINALLY FOUR TIMES A WEEK, Disp: 42.5 g, Rfl: 5    TRIMO-MCCRAY 0.025-0.01 % GEL, INSTILL CONTENTS OF 1 APPLICATOR INTO VAGINA 2 TIMES WEEKLY, Disp: 113.4 g, Rfl: 6    allopurinol (ZYLOPRIM) 300 MG tablet, TAKE 1 TABLET BY MOUTH EVERY DAY, Disp: , Rfl:     busPIRone (BUSPAR) 10 MG tablet, TAKE 1 TABLET BY MOUTH THREE TIMES A DAY, Disp: , Rfl:     escitalopram (LEXAPRO) 20 MG tablet, Take 20 mg by mouth daily, Disp: , Rfl:     LORazepam (ATIVAN) 1 MG tablet, Take 1 mg by mouth every 8 hours as needed.  , Disp: , Rfl:     doxycycline hyclate (VIBRAMYCIN) 100 MG capsule, Take 100 mg by mouth, Disp: , Rfl:     atorvastatin (LIPITOR) 10 MG tablet, Take 10 mg by mouth daily , Disp: , Rfl:     vitamin D (ERGOCALCIFEROL) 73734 units CAPS capsule, TK 1 C PO Q WK, Disp: , Rfl: 3    hydrOXYzine (ATARAX) 25 MG tablet, Take by mouth as needed , Disp: , Rfl:     Social History     Substance and Sexual Activity   Sexual Activity Not Currently    Partners: Male       Last Yearly:  2016    Last pap: 2016    Last HPV:     Have you had a positive covid test: No    Have you had the covid immunization: Yes    Chief Complaint   Patient presents with    Vaginal Pain     Patient states that her pessary has been out for over a week and she denies any pain of bleeding. She wants make sure that everything is healed up before the pessary goes back in.          PE:  Vital Signs  Blood pressure 118/76, height 5' 7\" (1.702 m), weight 151 lb (68.5 kg), not currently breastfeeding. Estimated body mass index is 23.65 kg/m² as calculated from the following:    Height as of this encounter: 5' 7\" (1.702 m). Weight as of this encounter: 151 lb (68.5 kg). No data recorded    NURSE: Ladonna DUMONT      HPI: Patient presents today states that the pessary started coming out when she was trying to have a difficult bowel movement. Patient states she decided just to pull it out and leave it out and it has been out for about a week. Yes PT denies fever, chills, nausea and vomiting       Objective     Pelvic Exam: GENITAL/URINARY: Granulation tissue noted in a 2 x 2 section on the protruding portion of the vaginal area. With speculum exam we also noted a 1 cm area to the right posterior portion of the vaginal wall and a 2 cm area to the left posterior portion of the vaginal wall    All were treated with silver nitrate                                                 Assessment and Plan          Diagnosis Orders   1. Uterine prolapse     2. Midline cystocele     3. Granulation tissue at vaginal vault  [place] CHEMICAL CAUTERIZATION OF GRANULATION TISSUE             I am having Jammie Khan maintain her hydrOXYzine, vitamin D, atorvastatin, doxycycline hyclate, busPIRone, escitalopram, LORazepam, allopurinol, Trimo-Phan, and estradiol.     Return in about 1 week (around 3/31/2022) for Check granulation tissue and replace pessary. She was also counseled on her preventative health maintenance recommendations and follow-up. There are no Patient Instructions on file for this visit.     LAWRENCE Brooks CNM,3/24/2022 3:33 PM

## 2022-03-31 ENCOUNTER — OFFICE VISIT (OUTPATIENT)
Dept: OBGYN | Age: 82
End: 2022-03-31
Payer: MEDICARE

## 2022-03-31 VITALS
BODY MASS INDEX: 23.7 KG/M2 | WEIGHT: 151 LBS | DIASTOLIC BLOOD PRESSURE: 66 MMHG | HEIGHT: 67 IN | SYSTOLIC BLOOD PRESSURE: 112 MMHG

## 2022-03-31 DIAGNOSIS — N81.11 MIDLINE CYSTOCELE: ICD-10-CM

## 2022-03-31 DIAGNOSIS — N89.8 GRANULATION TISSUE AT VAGINAL VAULT: Primary | ICD-10-CM

## 2022-03-31 PROCEDURE — 17250 CHEM CAUT OF GRANLTJ TISSUE: CPT | Performed by: ADVANCED PRACTICE MIDWIFE

## 2022-03-31 NOTE — PROGRESS NOTES
PROBLEM VISIT - pessary check    Date of service: 3/31/2022    Eli Obregon  Is a [de-identified] y.o.  female    PT's PCP is: Chino Priest MD     : 1940                                           Allergies: Bactrim [sulfamethoxazole-trimethoprim], Levofloxacin, Penicillin g, and Sulfamethoxazole    Chief Complaint   Patient presents with    Uterine Prolapse     Patient is being seen to have her pessary inserted. She has been feeling well with no complaints. Subjective:     PE:  Vital Signs  Blood pressure 112/66, height 5' 7\" (1.702 m), weight 151 lb (68.5 kg), not currently breastfeeding. Labs:    No results found for this visit on 22. HPI:   The patient was seen today for emulation tissue check and possible replacement of pessary. She admits to any vaginal bleeding after walking. She denies to any vaginal discharge or odor. Her bowels are regular and her voiding pattern is normal.     Objective:  Perineum/Speculum: There is not any signs of infection; the granulation tissue is noted on the anterior aspect of the vaginal vault. The pessary was Not replaced today procedure after Dr. Rg Sears assessment  We did place silver nitrate on the 2 x 2 granulation area on the  Anterior aspect of the vaginal    Assessment:   Diagnosis Orders   1. Granulation tissue at vaginal vault  [place] CHEMICAL CAUTERIZATION OF GRANULATION TISSUE   2.  Midline cystocele         Patient Active Problem List    Diagnosis Date Noted    Uterine prolapse 2022    Enterocele 2022    Frequency of urination 2022    Retention of urine 2022    Urge incontinence of urine 2022    Urinary incontinence 2022    Thrombocytopenia ([address]     Uncomplicated bereavement     Mantle cell lymphoma ([address] 2020    Breast calcifications on mammogram 10/27/2020    Splenomegaly 10/09/2020    History of adenomatous polyp of colon 10/29/2018    Hyperlipidemia 05/16/2018    Slow transit constipation 05/01/2018    Anxiety 08/26/2017    History of breast cancer 08/26/2017    Normocytic anemia 08/26/2017    Renal oncocytoma of left kidney 08/26/2017     Overview:   Left 2.6 centimeter enhancing renal mass  Biopsy October 6, 2017 demonstrating oncocytoma    I explained to the patient and her  that renal oncocytomas are benign. There are some renal cell carcinomas that can have oncocytic features and because of this renal cell carcinoma cannot be completely ruled out which was commented on by the pathologist..  Recommendation is to follow this with serial imaging. In reviewing the films appears to in its location and size that this should be able to be seen easily with renal ultrasound. If it grows we may consider rebiopsy.  S/P total hysterectomy and BSO (bilateral salpingo-oophorectomy) 10/23/2015    Midline cystocele 10/23/2015    Post-menopause on HRT (hormone replacement therapy) 10/23/2015    Blepharochalasis 08/27/2013           Plan:  1. Return to the office 8 weeks  2. Report any vaginal bleeding or discharge  3. Abstinence         She was  counseled on her preventative health maintenance recommendations and follow-up.                  LAWRENCE Godinez CNM,3/31/2022 4:13 PM

## 2022-04-07 ENCOUNTER — OFFICE VISIT (OUTPATIENT)
Dept: OBGYN | Age: 82
End: 2022-04-07
Payer: MEDICARE

## 2022-04-07 VITALS — HEIGHT: 67 IN | SYSTOLIC BLOOD PRESSURE: 114 MMHG | DIASTOLIC BLOOD PRESSURE: 62 MMHG | BODY MASS INDEX: 23.65 KG/M2

## 2022-04-07 DIAGNOSIS — N99.3 VAGINAL VAULT PROLAPSE AFTER HYSTERECTOMY: ICD-10-CM

## 2022-04-07 DIAGNOSIS — N89.8 GRANULATION TISSUE AT VAGINAL VAULT: Primary | ICD-10-CM

## 2022-04-07 DIAGNOSIS — N81.11 MIDLINE CYSTOCELE: ICD-10-CM

## 2022-04-07 PROCEDURE — 4040F PNEUMOC VAC/ADMIN/RCVD: CPT | Performed by: ADVANCED PRACTICE MIDWIFE

## 2022-04-07 PROCEDURE — G8427 DOCREV CUR MEDS BY ELIG CLIN: HCPCS | Performed by: ADVANCED PRACTICE MIDWIFE

## 2022-04-07 PROCEDURE — G8420 CALC BMI NORM PARAMETERS: HCPCS | Performed by: ADVANCED PRACTICE MIDWIFE

## 2022-04-07 PROCEDURE — 1123F ACP DISCUSS/DSCN MKR DOCD: CPT | Performed by: ADVANCED PRACTICE MIDWIFE

## 2022-04-07 PROCEDURE — 99211 OFF/OP EST MAY X REQ PHY/QHP: CPT

## 2022-04-07 PROCEDURE — 99213 OFFICE O/P EST LOW 20 MIN: CPT | Performed by: ADVANCED PRACTICE MIDWIFE

## 2022-04-07 PROCEDURE — G8400 PT W/DXA NO RESULTS DOC: HCPCS | Performed by: ADVANCED PRACTICE MIDWIFE

## 2022-04-07 PROCEDURE — 1036F TOBACCO NON-USER: CPT | Performed by: ADVANCED PRACTICE MIDWIFE

## 2022-04-07 PROCEDURE — 1090F PRES/ABSN URINE INCON ASSESS: CPT | Performed by: ADVANCED PRACTICE MIDWIFE

## 2022-04-07 NOTE — PROGRESS NOTES
PROBLEM VISIT - pessary check    Date of service: 2022    Casey Montilla  Is a [de-identified] y.o.  female    PT's PCP is: Ellena Eisenmenger, MD     : 1940                                           Allergies: Bactrim [sulfamethoxazole-trimethoprim], Levofloxacin, Penicillin g, and Sulfamethoxazole    Chief Complaint   Patient presents with    Other     pt presents here today to have her pessary replaced. pt denies any bleeding        Last pap: 10/4/16     Subjective:     PE:  Vital Signs  Blood pressure 114/62, height 5' 7\" (1.702 m), not currently breastfeeding. Labs:    No results found for this visit on 22. NURSE: JOSSELINE    HPI:   The patient was seen today for granulation check and pessary replacement. She denies any vaginal bleeding. She denies to any vaginal discharge or odor. Her bowels are regular and her voiding pattern is normal. The granulation tissue is all healed    Objective:  Perineum/Speculum: There is not any signs of infection; The vaginal vault is without any signs of erythema or erosion. There is no vaginal discharge or odor appreciated. The pessary was cleansed and replacedwithout any problems and the patient tolerated procedure well. Assessment:   Diagnosis Orders   1. Granulation tissue at vaginal vault     2. Vaginal vault prolapse after hysterectomy     3.  Midline cystocele         Patient Active Problem List    Diagnosis Date Noted    Uterine prolapse 2022    Enterocele 2022    Frequency of urination 2022    Retention of urine 2022    Urge incontinence of urine 2022    Urinary incontinence 2022    Thrombocytopenia ([address] 10/87/5620    Uncomplicated bereavement     Mantle cell [facility] ([facility] [address] 2020    Breast calcifications on mammogram 10/27/2020    Splenomegaly 10/09/2020    History of adenomatous polyp of colon 10/29/2018    Hyperlipidemia 2018    Slow transit constipation 2018    Anxiety 08/26/2017    History of breast cancer 08/26/2017    Normocytic anemia 08/26/2017    Renal oncocytoma of left kidney 08/26/2017     Overview:   Left 2.6 centimeter enhancing renal mass  Biopsy October 6, 2017 demonstrating oncocytoma    I explained to the patient and her  that renal oncocytomas are benign. There are some renal cell carcinomas that can have oncocytic features and because of this renal cell carcinoma cannot be completely ruled out which was commented on by the pathologist..  Recommendation is to follow this with serial imaging. In reviewing the films appears to in its location and size that this should be able to be seen easily with renal ultrasound. If it grows we may consider rebiopsy.  S/P total hysterectomy and BSO (bilateral salpingo-oophorectomy) 10/23/2015    Midline cystocele 10/23/2015    Post-menopause on HRT (hormone replacement therapy) 10/23/2015    Blepharochalasis 08/27/2013           Plan:  1. Return to the office 8 weeks  2. Report any vaginal bleeding or discharge  3. Abstinence         She was  counseled on her preventative health maintenance recommendations and follow-up.              Time spent  25 minutes    LAWRENCE Avendano CNM,4/7/2022 10:20 PM

## 2022-06-06 ENCOUNTER — OFFICE VISIT (OUTPATIENT)
Dept: OBGYN | Age: 82
End: 2022-06-06
Payer: MEDICARE

## 2022-06-06 VITALS
WEIGHT: 145.6 LBS | HEART RATE: 64 BPM | DIASTOLIC BLOOD PRESSURE: 62 MMHG | HEIGHT: 67 IN | SYSTOLIC BLOOD PRESSURE: 102 MMHG | BODY MASS INDEX: 22.85 KG/M2

## 2022-06-06 DIAGNOSIS — T83.9XXS: Primary | ICD-10-CM

## 2022-06-06 PROCEDURE — 1036F TOBACCO NON-USER: CPT | Performed by: ADVANCED PRACTICE MIDWIFE

## 2022-06-06 PROCEDURE — 1123F ACP DISCUSS/DSCN MKR DOCD: CPT | Performed by: ADVANCED PRACTICE MIDWIFE

## 2022-06-06 PROCEDURE — G8427 DOCREV CUR MEDS BY ELIG CLIN: HCPCS | Performed by: ADVANCED PRACTICE MIDWIFE

## 2022-06-06 PROCEDURE — G8400 PT W/DXA NO RESULTS DOC: HCPCS | Performed by: ADVANCED PRACTICE MIDWIFE

## 2022-06-06 PROCEDURE — 99213 OFFICE O/P EST LOW 20 MIN: CPT | Performed by: ADVANCED PRACTICE MIDWIFE

## 2022-06-06 PROCEDURE — 99211 OFF/OP EST MAY X REQ PHY/QHP: CPT

## 2022-06-06 PROCEDURE — 1090F PRES/ABSN URINE INCON ASSESS: CPT | Performed by: ADVANCED PRACTICE MIDWIFE

## 2022-06-06 PROCEDURE — G8420 CALC BMI NORM PARAMETERS: HCPCS | Performed by: ADVANCED PRACTICE MIDWIFE

## 2022-06-06 ASSESSMENT — PATIENT HEALTH QUESTIONNAIRE - PHQ9
SUM OF ALL RESPONSES TO PHQ QUESTIONS 1-9: 2
SUM OF ALL RESPONSES TO PHQ QUESTIONS 1-9: 2
1. LITTLE INTEREST OR PLEASURE IN DOING THINGS: 1
SUM OF ALL RESPONSES TO PHQ QUESTIONS 1-9: 2
SUM OF ALL RESPONSES TO PHQ9 QUESTIONS 1 & 2: 2
SUM OF ALL RESPONSES TO PHQ QUESTIONS 1-9: 2
2. FEELING DOWN, DEPRESSED OR HOPELESS: 1

## 2022-06-06 NOTE — PROGRESS NOTES
PROBLEM VISIT - pessary fell out and had spotting  Date of service: 2022    Morenita Zhao  Is a [de-identified] y.o.  female    PT's PCP is: Jonathon Valentino MD     : 1940                                           Allergies: Bactrim [sulfamethoxazole-trimethoprim], Levofloxacin, Penicillin g, and Sulfamethoxazole    Chief Complaint   Patient presents with    Vaginal Bleeding     pt states her pessary fell out a week ago, she did have some vaginal bleeding but it has since stopped. pt would like it placed again        Last pap: 10/4/16     Subjective:     PE:  Vital Signs  Blood pressure 102/62, pulse 64, height 5' 7\" (1.702 m), weight 145 lb 9.6 oz (66 kg), not currently breastfeeding. Labs:    No results found for this visit on 22. NURSE: JOSSELINE    HPI:   The patient was seen today for spotting last week when pessary fell out. She has vaginal spotting. She denies to any vaginal discharge or odor. Her bowels are regular and her voiding pattern is normal.     Objective:  Perineum/Speculum: There is not any signs of infection; The vaginal vault is without any signs of erythema or erosion. There is no vaginal discharge or odor appreciated. The pessary was cleansed and replacedwithout any problems and the patient tolerated procedure well. Assessment:   Diagnosis Orders   1.  Problem with vaginal pessary, sequela         Patient Active Problem List    Diagnosis Date Noted    Uterine prolapse 2022    Enterocele 2022    Frequency of urination 2022    Retention of urine 2022    Urge incontinence of urine 2022    Urinary incontinence 2022    Thrombocytopenia ([address]     Uncomplicated bereavement     Mantle cell lymphoma ([address] 2020    Breast calcifications on mammogram 10/27/2020    Splenomegaly 10/09/2020    History of adenomatous polyp of colon 10/29/2018    Hyperlipidemia 2018    Slow transit constipation 2018  Anxiety 08/26/2017    History of breast cancer 08/26/2017    Normocytic anemia 08/26/2017    Renal oncocytoma of left kidney 08/26/2017     Overview:   Left 2.6 centimeter enhancing renal mass  Biopsy October 6, 2017 demonstrating oncocytoma    I explained to the patient and her  that renal oncocytomas are benign. There are some renal cell carcinomas that can have oncocytic features and because of this renal cell carcinoma cannot be completely ruled out which was commented on by the pathologist..  Recommendation is to follow this with serial imaging. In reviewing the films appears to in its location and size that this should be able to be seen easily with renal ultrasound. If it grows we may consider rebiopsy.  S/P total hysterectomy and BSO (bilateral salpingo-oophorectomy) 10/23/2015    Midline cystocele 10/23/2015    Post-menopause on HRT (hormone replacement therapy) 10/23/2015    Blepharochalasis 08/27/2013           Plan:  1. Return to the office 8 weeks  2. Report any vaginal bleeding or discharge  3. Abstinence         She was  counseled on her preventative health maintenance recommendations and follow-up.                  LAWRENCE Ellis CNM,6/6/2022 8:57 AM

## 2022-08-16 ENCOUNTER — OFFICE VISIT (OUTPATIENT)
Dept: OBGYN | Age: 82
End: 2022-08-16
Payer: MEDICARE

## 2022-08-16 VITALS
DIASTOLIC BLOOD PRESSURE: 60 MMHG | HEIGHT: 67 IN | BODY MASS INDEX: 23.39 KG/M2 | SYSTOLIC BLOOD PRESSURE: 118 MMHG | WEIGHT: 149 LBS

## 2022-08-16 DIAGNOSIS — L92.9 GRANULATION TISSUE: ICD-10-CM

## 2022-08-16 DIAGNOSIS — N93.9 VAGINAL BLEEDING: Primary | ICD-10-CM

## 2022-08-16 PROCEDURE — 17250 CHEM CAUT OF GRANLTJ TISSUE: CPT | Performed by: ADVANCED PRACTICE MIDWIFE

## 2022-08-16 NOTE — PROGRESS NOTES
mammogram 10/27/2020    Splenomegaly 10/09/2020    History of adenomatous polyp of colon 10/29/2018    Hyperlipidemia 05/16/2018    Slow transit constipation 05/01/2018    Anxiety 08/26/2017    History of breast cancer 08/26/2017    Normocytic anemia 08/26/2017    Renal oncocytoma of left kidney 08/26/2017     Overview:   Left 2.6 centimeter enhancing renal mass  Biopsy October 6, 2017 demonstrating oncocytoma    I explained to the patient and her  that renal oncocytomas are benign. There are some renal cell carcinomas that can have oncocytic features and because of this renal cell carcinoma cannot be completely ruled out which was commented on by the pathologist..  Recommendation is to follow this with serial imaging. In reviewing the films appears to in its location and size that this should be able to be seen easily with renal ultrasound. If it grows we may consider rebiopsy. 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 Return to the office 8 weeks  2. Report any vaginal bleeding or discharge  3. Abstinence         She was  counseled on her preventative health maintenance recommendations and follow-up.            [address] 1:01 PM

## 2022-08-29 ENCOUNTER — PROCEDURE VISIT (OUTPATIENT)
Dept: OBGYN | Age: 82
End: 2022-08-29
Payer: MEDICARE

## 2022-08-29 VITALS
BODY MASS INDEX: 22.44 KG/M2 | SYSTOLIC BLOOD PRESSURE: 128 MMHG | WEIGHT: 143 LBS | HEIGHT: 67 IN | DIASTOLIC BLOOD PRESSURE: 76 MMHG

## 2022-08-29 DIAGNOSIS — N95.2 VAGINAL ATROPHY: ICD-10-CM

## 2022-08-29 DIAGNOSIS — Z46.89 PESSARY MAINTENANCE: ICD-10-CM

## 2022-08-29 PROCEDURE — 1036F TOBACCO NON-USER: CPT | Performed by: ADVANCED PRACTICE MIDWIFE

## 2022-08-29 PROCEDURE — G8420 CALC BMI NORM PARAMETERS: HCPCS | Performed by: ADVANCED PRACTICE MIDWIFE

## 2022-08-29 PROCEDURE — 1123F ACP DISCUSS/DSCN MKR DOCD: CPT | Performed by: ADVANCED PRACTICE MIDWIFE

## 2022-08-29 PROCEDURE — 99211 OFF/OP EST MAY X REQ PHY/QHP: CPT

## 2022-08-29 PROCEDURE — 99213 OFFICE O/P EST LOW 20 MIN: CPT | Performed by: ADVANCED PRACTICE MIDWIFE

## 2022-08-29 PROCEDURE — 1090F PRES/ABSN URINE INCON ASSESS: CPT | Performed by: ADVANCED PRACTICE MIDWIFE

## 2022-08-29 PROCEDURE — G8400 PT W/DXA NO RESULTS DOC: HCPCS | Performed by: ADVANCED PRACTICE MIDWIFE

## 2022-08-29 PROCEDURE — G8427 DOCREV CUR MEDS BY ELIG CLIN: HCPCS | Performed by: ADVANCED PRACTICE MIDWIFE

## 2022-08-29 RX ORDER — OXYQUINOLINE SULFATE AND SODIUM LAURYL SULFATE .25; .1 MG/G; MG/G
1 JELLY VAGINAL
Qty: 113.4 G | Refills: 6 | Status: SHIPPED | OUTPATIENT
Start: 2022-08-29

## 2022-08-29 NOTE — PROGRESS NOTES
PROBLEM VISIT - pessary check    Date of service: 2022    Gayle Arroyo  Is a [de-identified] y.o.  female    PT's PCP is: Amie Farris MD     : 1940                                           Allergies: Bactrim [sulfamethoxazole-trimethoprim], Levofloxacin, Penicillin g, and Sulfamethoxazole    No chief complaint on file. Subjective:     PE:  Vital Signs  Blood pressure 128/76, height 5' 7\" (1.702 m), weight 143 lb (64.9 kg), not currently breastfeeding. Labs:    No results found for this visit on 22. HPI:   The patient was seen today for replacement of pessary after healing of granulation tissue. She denies any vaginal bleeding. She denies to any vaginal discharge or odor. Her bowels are regular and her voiding pattern is normal.     Objective:  Perineum/Speculum: There is not any signs of infection; The vaginal vault is without any signs of erythema or erosion. There is no vaginal discharge or odor appreciated. The pessary was replacedwithout any problems and the patient tolerated procedure well. Assessment:   Diagnosis Orders   1. Pessary maintenance        2.  Vaginal atrophy            Patient Active Problem List    Diagnosis Date Noted    Uterine prolapse 2022    Enterocele 2022    Frequency of urination 2022    Retention of urine 2022    Urge incontinence of urine 2022    Urinary incontinence 2022    Thrombocytopenia ([address]     Uncomplicated bereavement     Mantle cell lymphoma ([address] 2020    Breast calcifications on mammogram 10/27/2020    Splenomegaly 10/09/2020    History of adenomatous polyp of colon 10/29/2018    Hyperlipidemia 2018    Slow transit constipation 2018    Anxiety 2017    History of breast cancer 2017    Normocytic anemia 2017    Renal oncocytoma of left kidney 2017     Overview:   Left 2.6 centimeter enhancing renal mass  Biopsy 2017 demonstrating oncocytoma    I explained to the patient and her  that renal oncocytomas are benign. There are some renal cell carcinomas that can have oncocytic features and because of this renal cell carcinoma cannot be completely ruled out which was commented on by the pathologist..  Recommendation is to follow this with serial imaging. In reviewing the films appears to in its location and size that this should be able to be seen easily with renal ultrasound. If it grows we may consider rebiopsy. 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 Return to the office 8 weeks  2. Report any vaginal bleeding or discharge  3. Abstinence         She was  counseled on her preventative health maintenance recommendations and follow-up.              LAWRENCE Jarquin CNM,8/29/2022 1:36 PM

## 2022-11-22 ENCOUNTER — OFFICE VISIT (OUTPATIENT)
Dept: OBGYN | Age: 82
End: 2022-11-22
Payer: MEDICARE

## 2022-11-22 VITALS
WEIGHT: 156 LBS | HEIGHT: 67 IN | SYSTOLIC BLOOD PRESSURE: 118 MMHG | BODY MASS INDEX: 24.48 KG/M2 | DIASTOLIC BLOOD PRESSURE: 78 MMHG

## 2022-11-22 DIAGNOSIS — Z46.89 PESSARY MAINTENANCE: Primary | ICD-10-CM

## 2022-11-22 DIAGNOSIS — N99.3 VAGINAL VAULT PROLAPSE AFTER HYSTERECTOMY: ICD-10-CM

## 2022-11-22 DIAGNOSIS — N81.11 MIDLINE CYSTOCELE: ICD-10-CM

## 2022-11-22 PROCEDURE — 1090F PRES/ABSN URINE INCON ASSESS: CPT | Performed by: ADVANCED PRACTICE MIDWIFE

## 2022-11-22 PROCEDURE — 1036F TOBACCO NON-USER: CPT | Performed by: ADVANCED PRACTICE MIDWIFE

## 2022-11-22 PROCEDURE — G8420 CALC BMI NORM PARAMETERS: HCPCS | Performed by: ADVANCED PRACTICE MIDWIFE

## 2022-11-22 PROCEDURE — G8400 PT W/DXA NO RESULTS DOC: HCPCS | Performed by: ADVANCED PRACTICE MIDWIFE

## 2022-11-22 PROCEDURE — G8484 FLU IMMUNIZE NO ADMIN: HCPCS | Performed by: ADVANCED PRACTICE MIDWIFE

## 2022-11-22 PROCEDURE — 1123F ACP DISCUSS/DSCN MKR DOCD: CPT | Performed by: ADVANCED PRACTICE MIDWIFE

## 2022-11-22 PROCEDURE — 99213 OFFICE O/P EST LOW 20 MIN: CPT | Performed by: ADVANCED PRACTICE MIDWIFE

## 2022-11-22 PROCEDURE — G8427 DOCREV CUR MEDS BY ELIG CLIN: HCPCS | Performed by: ADVANCED PRACTICE MIDWIFE

## 2022-11-22 NOTE — PROGRESS NOTES
PROBLEM VISIT - pessary check    Date of service: 2022    Estefania Uribe  Is a 81 y.o.  female    PT's PCP is: Roxanna Reardon MD     : 1940                                           Allergies: Bactrim [sulfamethoxazole-trimethoprim], Levofloxacin, Penicillin g, and Sulfamethoxazole    Chief Complaint   Patient presents with    Other     Patient here for pessary maintenance. Patient would also like a breast exam. No other questions or concerns. Subjective:     PE:  Vital Signs  Blood pressure 118/78, height 5' 7\" (1.702 m), weight 156 lb (70.8 kg), not currently breastfeeding. Labs:    No results found for this visit on 22. HPI:   The patient was seen today for breast exam and pessary maintenance. She admits to any slight vaginal bleeding. She denies to any vaginal discharge or odor. Her bowels are regular and her voiding pattern is normal.         Objective     Breasts: Breast:normal appearance, no masses or tenderness                     Perineum/Speculum: There is not any signs of infection; The vaginal vault is without any signs of erythema or erosion. There is no vaginal discharge or odor appreciated. The pessary was removed, cleansed, and replacedwithout any problems and the patient tolerated procedure well. Assessment:   Diagnosis Orders   1. Pessary maintenance        2. Vaginal vault prolapse after hysterectomy        3.  Midline cystocele            Patient Active Problem List    Diagnosis Date Noted    Uterine prolapse 2022    Enterocele 2022    Frequency of urination 2022    Retention of urine 2022    Urge incontinence of urine 2022    Urinary incontinence 2022    Thrombocytopenia ([address]     Uncomplicated bereavement     Mantle cell lymphoma ([address] 2020    Breast calcifications on mammogram 10/27/2020    Splenomegaly 10/09/2020    History of adenomatous polyp of colon 10/29/2018 Hyperlipidemia 05/16/2018    Slow transit constipation 05/01/2018    Anxiety 08/26/2017    History of breast cancer 08/26/2017    Normocytic anemia 08/26/2017    Renal oncocytoma of left kidney 08/26/2017     Overview:   Left 2.6 centimeter enhancing renal mass  Biopsy October 6, 2017 demonstrating oncocytoma    I explained to the patient and her  that renal oncocytomas are benign. There are some renal cell carcinomas that can have oncocytic features and because of this renal cell carcinoma cannot be completely ruled out which was commented on by the pathologist..  Recommendation is to follow this with serial imaging. In reviewing the films appears to in its location and size that this should be able to be seen easily with renal ultrasound. If it grows we may consider rebiopsy. 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 Return to the office 8 weeks  2. Report any vaginal bleeding or discharge  3. Abstinence         She was  counseled on her preventative health maintenance recommendations and follow-up.              LAWRENCE Alejandro CNM,11/22/2022 4:12 PM

## 2022-12-14 ENCOUNTER — TELEPHONE (OUTPATIENT)
Dept: OBGYN | Age: 82
End: 2022-12-14

## 2022-12-14 NOTE — TELEPHONE ENCOUNTER
Pt called and LM stating she has had vagina lbleeding since her pessary was cleaned and replaced back on 11/22/22. Per K Pool have pt see Dr Mateo Montenegro to evaluate.  Pt did not answer so I had to leave her a message with my ext

## 2022-12-29 ENCOUNTER — OFFICE VISIT (OUTPATIENT)
Dept: OBGYN | Age: 82
End: 2022-12-29
Payer: MEDICARE

## 2022-12-29 VITALS
HEIGHT: 67 IN | BODY MASS INDEX: 23.54 KG/M2 | WEIGHT: 150 LBS | TEMPERATURE: 97.9 F | SYSTOLIC BLOOD PRESSURE: 118 MMHG | DIASTOLIC BLOOD PRESSURE: 70 MMHG

## 2022-12-29 DIAGNOSIS — T83.89XD VAGINAL EROSION SECONDARY TO PESSARY USE, SUBSEQUENT ENCOUNTER: Primary | ICD-10-CM

## 2022-12-29 DIAGNOSIS — N89.8 VAGINAL EROSION SECONDARY TO PESSARY USE, SUBSEQUENT ENCOUNTER: Primary | ICD-10-CM

## 2022-12-29 PROCEDURE — 1123F ACP DISCUSS/DSCN MKR DOCD: CPT | Performed by: OBSTETRICS & GYNECOLOGY

## 2022-12-29 PROCEDURE — 1090F PRES/ABSN URINE INCON ASSESS: CPT | Performed by: OBSTETRICS & GYNECOLOGY

## 2022-12-29 PROCEDURE — 17250 CHEM CAUT OF GRANLTJ TISSUE: CPT | Performed by: OBSTETRICS & GYNECOLOGY

## 2022-12-29 PROCEDURE — 1036F TOBACCO NON-USER: CPT | Performed by: OBSTETRICS & GYNECOLOGY

## 2022-12-29 PROCEDURE — G8420 CALC BMI NORM PARAMETERS: HCPCS | Performed by: OBSTETRICS & GYNECOLOGY

## 2022-12-29 PROCEDURE — G8484 FLU IMMUNIZE NO ADMIN: HCPCS | Performed by: OBSTETRICS & GYNECOLOGY

## 2022-12-29 PROCEDURE — G8400 PT W/DXA NO RESULTS DOC: HCPCS | Performed by: OBSTETRICS & GYNECOLOGY

## 2022-12-29 PROCEDURE — 99211 OFF/OP EST MAY X REQ PHY/QHP: CPT | Performed by: OBSTETRICS & GYNECOLOGY

## 2022-12-29 PROCEDURE — G8427 DOCREV CUR MEDS BY ELIG CLIN: HCPCS | Performed by: OBSTETRICS & GYNECOLOGY

## 2022-12-29 PROCEDURE — 99213 OFFICE O/P EST LOW 20 MIN: CPT | Performed by: OBSTETRICS & GYNECOLOGY

## 2022-12-29 NOTE — PROGRESS NOTES
PROBLEM VISIT - pessary check    Date of service: 2022    Gustavo Barrera  Is a 81 y.o.  female    PT's PCP is: Virgil Rockwell MD     : 1940                                           Allergies: Bactrim [sulfamethoxazole-trimethoprim], Levofloxacin, Penicillin g, and Sulfamethoxazole    Chief Complaint   Patient presents with    Vaginal Bleeding     Pt states she has been having pessary irritation and vaginal bleeding with pessary. Pt removed pessary 2 weeks ago and vaginal bleeding has since stopped and no longer has any irritation. Last pap: 10/4/16     Nurse:LMD   PE:  Vital Signs  Blood pressure 118/70, temperature 97.9 °F (36.6 °C), height 5' 7\" (1.702 m), weight 150 lb (68 kg), not currently breastfeeding. Labs:    No results found for this visit on 22. NURSE: Lawyer Delatorre    HPI: The patient is here she has removed her pessary because she had an episode of light bleeding. She has had these episodes before and does use a Gellhorn pessary. Yes  PT denies fever, chills, nausea and vomiting       Objective: There are 2 superficial areas of erosion each about a centimeter in size. One on the left portion where there is vaginal bulging and 1 in the central area. Assessment and Plan: These areas are treated with silver nitrate. I will have the patient return early next week for a follow-up visit and recheck          Diagnosis Orders   1. Vaginal erosion secondary to pessary use, subsequent encounter  [place] CHEMICAL CAUTERIZATION OF GRANULATION TISSUE                No follow-ups on file. FF: 15 minutes    There are no Patient Instructions on file for this visit. Over 50%of time spent on counseling and care coordination on: see assessment and plan,  She was also counseled on her preventative health maintenance recommendations and follow-up.       Praveena Tracy MD,2022 9:21 AM

## 2023-01-03 ENCOUNTER — OFFICE VISIT (OUTPATIENT)
Dept: OBGYN | Age: 83
End: 2023-01-03
Payer: MEDICARE

## 2023-01-03 VITALS
BODY MASS INDEX: 23.54 KG/M2 | DIASTOLIC BLOOD PRESSURE: 70 MMHG | WEIGHT: 150 LBS | HEIGHT: 67 IN | SYSTOLIC BLOOD PRESSURE: 120 MMHG

## 2023-01-03 DIAGNOSIS — N89.8 VAGINAL EROSION SECONDARY TO PESSARY USE, SUBSEQUENT ENCOUNTER: Primary | ICD-10-CM

## 2023-01-03 DIAGNOSIS — T83.89XD VAGINAL EROSION SECONDARY TO PESSARY USE, SUBSEQUENT ENCOUNTER: Primary | ICD-10-CM

## 2023-01-03 PROCEDURE — 99212 OFFICE O/P EST SF 10 MIN: CPT | Performed by: OBSTETRICS & GYNECOLOGY

## 2023-01-03 PROCEDURE — G8420 CALC BMI NORM PARAMETERS: HCPCS | Performed by: OBSTETRICS & GYNECOLOGY

## 2023-01-03 PROCEDURE — 1123F ACP DISCUSS/DSCN MKR DOCD: CPT | Performed by: OBSTETRICS & GYNECOLOGY

## 2023-01-03 PROCEDURE — 99211 OFF/OP EST MAY X REQ PHY/QHP: CPT | Performed by: OBSTETRICS & GYNECOLOGY

## 2023-01-03 PROCEDURE — G8400 PT W/DXA NO RESULTS DOC: HCPCS | Performed by: OBSTETRICS & GYNECOLOGY

## 2023-01-03 PROCEDURE — G8484 FLU IMMUNIZE NO ADMIN: HCPCS | Performed by: OBSTETRICS & GYNECOLOGY

## 2023-01-03 PROCEDURE — 1036F TOBACCO NON-USER: CPT | Performed by: OBSTETRICS & GYNECOLOGY

## 2023-01-03 PROCEDURE — 17250 CHEM CAUT OF GRANLTJ TISSUE: CPT | Performed by: OBSTETRICS & GYNECOLOGY

## 2023-01-03 PROCEDURE — G8427 DOCREV CUR MEDS BY ELIG CLIN: HCPCS | Performed by: OBSTETRICS & GYNECOLOGY

## 2023-01-03 PROCEDURE — 1090F PRES/ABSN URINE INCON ASSESS: CPT | Performed by: OBSTETRICS & GYNECOLOGY

## 2023-01-03 RX ORDER — AZITHROMYCIN 250 MG/1
TABLET, FILM COATED ORAL
COMMUNITY
Start: 2022-12-30

## 2023-01-03 NOTE — PROGRESS NOTES
PROBLEM VISIT     Date of service: 1/3/2023    Salbador Douglass  Is a 81 y.o.  female    PT's PCP is: Sage Michael MD     : 1940                                             Subjective:       No LMP recorded (lmp unknown). Patient has had a hysterectomy. OB History    Para Term  AB Living   3 3 3     3   SAB IAB Ectopic Molar Multiple Live Births             3      # Outcome Date GA Lbr Chapo/2nd Weight Sex Delivery Anes PTL Lv   3 Term 73 40w0d  6 lb (2.722 kg) F Vag-Spont   NAGA   2 Term 70 40w0d  5 lb (2.268 kg) F Vag-Spont   NAGA   1 Term 11/15/64 40w0d  7 lb 0.5 oz (3.189 kg) M Vag-Spont   NAGA        Social History     Tobacco Use   Smoking Status Never   Smokeless Tobacco Never        Social History     Substance and Sexual Activity   Alcohol Use No    Alcohol/week: 0.0 standard drinks       Social History     Substance and Sexual Activity   Sexual Activity Not Currently    Partners: Male       Allergies: Bactrim [sulfamethoxazole-trimethoprim], Levofloxacin, Penicillin g, and Sulfamethoxazole    Chief Complaint   Patient presents with    Follow-up     Pt is here today for follow up, pt had vaginal erosion secondary to pessary use. Last Yearly:  10/4/16    Last pap: 10/4/16    Last HPV: never      NURSE: LMD    PE:  Vital Signs  Blood pressure 120/70, height 5' 7\" (1.702 m), weight 150 lb (68 kg), not currently breastfeeding. Labs:    No results found for this visit on 23. NURSE: Chayo Puente    HPI: The patient is here for a follow-up visit she has superficial vaginal erosion secondary to pessary use. Yes  PT denies fever, chills, nausea and vomiting       Objective: Vaginal erosions were treated last week with silver nitrate. They are still present. Assessment and Plan: Silver nitrate again was used over these superficial vaginal erosions. I will have her follow-up later this week          Diagnosis Orders   1. Vaginal erosion secondary to pessary use, subsequent encounter  [place] CHEMICAL CAUTERIZATION OF GRANULATION TISSUE                No follow-ups on file. FF: 10 minutes    There are no Patient Instructions on file for this visit. Over 50%of time spent on counseling and care coordination on: see assessment and plan,  She was also counseled on her preventative health maintenance recommendations and follow-up.       Boris Ventura MD,1/3/2023 9:24 AM

## 2023-01-06 ENCOUNTER — OFFICE VISIT (OUTPATIENT)
Dept: OBGYN | Age: 83
End: 2023-01-06
Payer: MEDICARE

## 2023-01-06 VITALS
SYSTOLIC BLOOD PRESSURE: 116 MMHG | HEIGHT: 67 IN | BODY MASS INDEX: 23.54 KG/M2 | WEIGHT: 150 LBS | DIASTOLIC BLOOD PRESSURE: 80 MMHG

## 2023-01-06 DIAGNOSIS — T83.89XD VAGINAL EROSION SECONDARY TO PESSARY USE, SUBSEQUENT ENCOUNTER: Primary | ICD-10-CM

## 2023-01-06 DIAGNOSIS — N89.8 VAGINAL EROSION SECONDARY TO PESSARY USE, SUBSEQUENT ENCOUNTER: Primary | ICD-10-CM

## 2023-01-06 PROCEDURE — 17250 CHEM CAUT OF GRANLTJ TISSUE: CPT | Performed by: OBSTETRICS & GYNECOLOGY

## 2023-01-06 NOTE — PROGRESS NOTES
PROBLEM VISIT     Date of service: 2023    Maegan William  Is a 81 y.o. , female    PT's PCP is: Claire Diggs MD     : 1940                                             Subjective:       No LMP recorded (lmp unknown). Patient has had a hysterectomy. OB History    Para Term  AB Living   3 3 3     3   SAB IAB Ectopic Molar Multiple Live Births             3      # Outcome Date GA Lbr Chapo/2nd Weight Sex Delivery Anes PTL Lv   3 Term 73 40w0d  6 lb (2.722 kg) F Vag-Spont   NAGA   2 Term 70 40w0d  5 lb (2.268 kg) F Vag-Spont   NAGA   1 Term 11/15/64 40w0d  7 lb 0.5 oz (3.189 kg) M Vag-Spont   NAGA        Social History     Tobacco Use   Smoking Status Never   Smokeless Tobacco Never        Social History     Substance and Sexual Activity   Alcohol Use No    Alcohol/week: 0.0 standard drinks       Allergies: Bactrim [sulfamethoxazole-trimethoprim], Levofloxacin, Penicillin g, and Sulfamethoxazole      Current Outpatient Medications:     azithromycin (ZITHROMAX) 250 MG tablet, TAKE 2 TABLETS BY MOUTH TODAY, THEN TAKE 1 TABLET DAILY FOR 4 DAYS, Disp: , Rfl:     Oxyquinoline-Sod Lauryl Sulf (TRIMO-MCCRAY) 0.025-0.01 % GEL, Place 1 applicator vaginally Twice a Week, Disp: 113.4 g, Rfl: 6    estradiol (ESTRACE) 0.1 MG/GM vaginal cream, PLACE 0.25 G VAGINALLY FOUR TIMES A WEEK, Disp: 42.5 g, Rfl: 5    allopurinol (ZYLOPRIM) 300 MG tablet, TAKE 1 TABLET BY MOUTH EVERY DAY, Disp: , Rfl:     escitalopram (LEXAPRO) 20 MG tablet, Take 20 mg by mouth daily, Disp: , Rfl:     LORazepam (ATIVAN) 1 MG tablet, Take 1 mg by mouth every 8 hours as needed. , Disp: , Rfl:     atorvastatin (LIPITOR) 10 MG tablet, Take 10 mg by mouth daily , Disp: , Rfl:     hydrOXYzine (ATARAX) 25 MG tablet, Take by mouth as needed, Disp: , Rfl:     Social History     Substance and Sexual Activity   Sexual Activity Not Currently    Partners: Male       Last Yearly date:  1/3/22    Last pap date and results: 1/3/22 Normal    Chief Complaint   Patient presents with    Follow-up     Pt is here today for follow up, pt had vaginal erosion secondary to pessary use. She has brought pessary and hoping it could be reinserted. She is currently having no bleeding or vaginal pain. PE:  Vital Signs  Blood pressure 116/80, height 5' 7\" (1.702 m), weight 150 lb (68 kg), not currently breastfeeding. Estimated body mass index is 23.49 kg/m² as calculated from the following:    Height as of this encounter: 5' 7\" (1.702 m). Weight as of this encounter: 150 lb (68 kg). No data recorded    NURSE: Emanuel DUMONT      HPI: The patient is here today for follow-up for vaginal erosion from pessary use. She has been receiving silver nitrate therapy    Yes PT denies fever, chills, nausea and vomiting       Objective     Pelvic Exam: GENITAL/URINARY:  Vagina:  Abnormal findings: There were 2 small superficial areas of vaginal erosion 1 on the right side and 1 at the 12:00 area that have granulated incompletely. There is a larger area of erosion on the left side that is much more superficial and looks better but is still present                                                    Results reviewed today:    No results found for this visit on 01/06/23.     Assessment/plan: Patient is very disappointed that I am unable to replace the pessary at this time she is wondering about other alternatives and I did recommend consultation with pelvic floor surgeon if she is desires

## 2023-01-11 ENCOUNTER — OFFICE VISIT (OUTPATIENT)
Dept: OBGYN | Age: 83
End: 2023-01-11
Payer: MEDICARE

## 2023-01-11 VITALS
BODY MASS INDEX: 23.54 KG/M2 | DIASTOLIC BLOOD PRESSURE: 62 MMHG | SYSTOLIC BLOOD PRESSURE: 118 MMHG | WEIGHT: 150 LBS | HEIGHT: 67 IN

## 2023-01-11 DIAGNOSIS — T83.89XD VAGINAL EROSION SECONDARY TO PESSARY USE, SUBSEQUENT ENCOUNTER: Primary | ICD-10-CM

## 2023-01-11 DIAGNOSIS — N89.8 VAGINAL EROSION SECONDARY TO PESSARY USE, SUBSEQUENT ENCOUNTER: Primary | ICD-10-CM

## 2023-01-11 PROCEDURE — G8400 PT W/DXA NO RESULTS DOC: HCPCS | Performed by: OBSTETRICS & GYNECOLOGY

## 2023-01-11 PROCEDURE — 17250 CHEM CAUT OF GRANLTJ TISSUE: CPT | Performed by: OBSTETRICS & GYNECOLOGY

## 2023-01-11 PROCEDURE — G8484 FLU IMMUNIZE NO ADMIN: HCPCS | Performed by: OBSTETRICS & GYNECOLOGY

## 2023-01-11 PROCEDURE — 99211 OFF/OP EST MAY X REQ PHY/QHP: CPT | Performed by: OBSTETRICS & GYNECOLOGY

## 2023-01-11 PROCEDURE — G8420 CALC BMI NORM PARAMETERS: HCPCS | Performed by: OBSTETRICS & GYNECOLOGY

## 2023-01-11 PROCEDURE — 1123F ACP DISCUSS/DSCN MKR DOCD: CPT | Performed by: OBSTETRICS & GYNECOLOGY

## 2023-01-11 PROCEDURE — 99212 OFFICE O/P EST SF 10 MIN: CPT | Performed by: OBSTETRICS & GYNECOLOGY

## 2023-01-11 PROCEDURE — 1036F TOBACCO NON-USER: CPT | Performed by: OBSTETRICS & GYNECOLOGY

## 2023-01-11 PROCEDURE — G8427 DOCREV CUR MEDS BY ELIG CLIN: HCPCS | Performed by: OBSTETRICS & GYNECOLOGY

## 2023-01-11 PROCEDURE — 1090F PRES/ABSN URINE INCON ASSESS: CPT | Performed by: OBSTETRICS & GYNECOLOGY

## 2023-01-11 NOTE — PROGRESS NOTES
PROBLEM VISIT     Date of service: 2023    Jeffrey Friedman  Is a 81 y.o.  female    PT's PCP is: Agustín Hyde MD     : 1940                                             Subjective:       No LMP recorded (lmp unknown). Patient has had a hysterectomy. OB History    Para Term  AB Living   3 3 3     3   SAB IAB Ectopic Molar Multiple Live Births             3      # Outcome Date GA Lbr Chapo/2nd Weight Sex Delivery Anes PTL Lv   3 Term 73 40w0d  6 lb (2.722 kg) F Vag-Spont   NAGA   2 Term 70 40w0d  5 lb (2.268 kg) F Vag-Spont   NAGA   1 Term 11/15/64 40w0d  7 lb 0.5 oz (3.189 kg) M Vag-Spont   NAGA        Social History     Tobacco Use   Smoking Status Never   Smokeless Tobacco Never        Social History     Substance and Sexual Activity   Alcohol Use No    Alcohol/week: 0.0 standard drinks       Social History     Substance and Sexual Activity   Sexual Activity Not Currently    Partners: Male       Allergies: Bactrim [sulfamethoxazole-trimethoprim], Levofloxacin, Nirmatrelvir-ritonavir, Penicillin g, and Sulfamethoxazole    Chief Complaint   Patient presents with    Follow-up     Pt is here today for pessary follow up. Last Yearly:  10/4/16    Last pap: 10/4/16    Last HPV: never      NURSE: LMD  PE:  Vital Signs  Blood pressure 118/62, height 5' 7\" (1.702 m), weight 150 lb (68 kg), not currently breastfeeding. Labs:    No results found for this visit on 23. NURSE: Dalia Vargas    HPI: The patient is here for follow-up treatment for vaginal erosion from pessary use. Yes  PT denies fever, chills, nausea and vomiting       Objective: There is a area of erosion at the 3:00 area (in the left lateral vagina). This was thoroughly treated with silver nitrate. This area is improving but slowly with recurrent treatments.   The 2 smaller lesions in the central vagina and on the right side have completely healed                           Assessment and Plan: Follow-up for possible retreatment of vaginal erosion or replacement of pessary. Diagnosis Orders   1. Vaginal erosion secondary to pessary use, subsequent encounter  [place] CHEMICAL CAUTERIZATION OF GRANULATION TISSUE                No follow-ups on file. FF: 10 minutes    There are no Patient Instructions on file for this visit. Over 50%of time spent on counseling and care coordination on: see assessment and plan,  She was also counseled on her preventative health maintenance recommendations and follow-up.       Praveena Tracy MD,1/11/2023 1:11 PM

## 2023-01-17 ENCOUNTER — OFFICE VISIT (OUTPATIENT)
Dept: OBGYN | Age: 83
End: 2023-01-17
Payer: MEDICARE

## 2023-01-17 VITALS
SYSTOLIC BLOOD PRESSURE: 118 MMHG | DIASTOLIC BLOOD PRESSURE: 72 MMHG | BODY MASS INDEX: 23.54 KG/M2 | WEIGHT: 150 LBS | HEIGHT: 67 IN

## 2023-01-17 DIAGNOSIS — T83.89XD VAGINAL EROSION SECONDARY TO PESSARY USE, SUBSEQUENT ENCOUNTER: Primary | ICD-10-CM

## 2023-01-17 DIAGNOSIS — N89.8 VAGINAL EROSION SECONDARY TO PESSARY USE, SUBSEQUENT ENCOUNTER: Primary | ICD-10-CM

## 2023-01-17 PROCEDURE — 99213 OFFICE O/P EST LOW 20 MIN: CPT | Performed by: ADVANCED PRACTICE MIDWIFE

## 2023-01-17 PROCEDURE — G8420 CALC BMI NORM PARAMETERS: HCPCS | Performed by: ADVANCED PRACTICE MIDWIFE

## 2023-01-17 PROCEDURE — G8400 PT W/DXA NO RESULTS DOC: HCPCS | Performed by: ADVANCED PRACTICE MIDWIFE

## 2023-01-17 PROCEDURE — 1123F ACP DISCUSS/DSCN MKR DOCD: CPT | Performed by: ADVANCED PRACTICE MIDWIFE

## 2023-01-17 PROCEDURE — 1090F PRES/ABSN URINE INCON ASSESS: CPT | Performed by: ADVANCED PRACTICE MIDWIFE

## 2023-01-17 PROCEDURE — G8484 FLU IMMUNIZE NO ADMIN: HCPCS | Performed by: ADVANCED PRACTICE MIDWIFE

## 2023-01-17 PROCEDURE — 1036F TOBACCO NON-USER: CPT | Performed by: ADVANCED PRACTICE MIDWIFE

## 2023-01-17 PROCEDURE — G8427 DOCREV CUR MEDS BY ELIG CLIN: HCPCS | Performed by: ADVANCED PRACTICE MIDWIFE

## 2023-01-17 ASSESSMENT — PATIENT HEALTH QUESTIONNAIRE - PHQ9
SUM OF ALL RESPONSES TO PHQ9 QUESTIONS 1 & 2: 0
SUM OF ALL RESPONSES TO PHQ QUESTIONS 1-9: 0
2. FEELING DOWN, DEPRESSED OR HOPELESS: 0
1. LITTLE INTEREST OR PLEASURE IN DOING THINGS: 0

## 2023-01-17 NOTE — PROGRESS NOTES
PROBLEM VISIT - pessary check    Date of service: 2023    Reese Vieyra  Is a 81 y.o.  female    PT's PCP is: Samantha Colon MD     : 1940                                           Allergies: Bactrim [sulfamethoxazole-trimethoprim], Levofloxacin, Nirmatrelvir-ritonavir, Penicillin g, and Sulfamethoxazole    Chief Complaint   Patient presents with    Vaginal Pain     Vaginal erosion from pessary needs silver nitrate, pt states no bleeding or pain        Last pap: 10/4/16     Subjective:     PE:  Vital Signs  Blood pressure 118/72, height 5' 7\" (1.702 m), weight 150 lb (68 kg), not currently breastfeeding. Labs:    No results found for this visit on 23. NURSE: JOSSELINE    HPI:   The patient was seen today for erosion on the vaginal wall. She denies any vaginal bleeding. She denies to any vaginal discharge or odor. Her bowels are regular and her voiding pattern is normal.     Objective:  Perineum/Speculum: There is any signs of infection; The vaginal vault is not without any signs of erythema or erosion. There is no vaginal discharge or odor appreciated. The 3 o'clock position is healing but another treatment of silver nitrate was applied  Upon inspection it was noted in the 6-o'clock posterior vag inal wall was noted some erosion - silver nitrate was applied        Assessment:   Diagnosis Orders   1.  Vaginal erosion secondary to pessary use, subsequent encounter            Patient Active Problem List    Diagnosis Date Noted    Uterine prolapse 2022    Enterocele 2022    Frequency of urination 2022    Retention of urine 2022    Urge incontinence of urine 2022    Urinary incontinence 2022    Thrombocytopenia ([facility] [address] 4352    Uncomplicated bereavement     Mantle cell lymphoma ([facility] [address] 2020    Breast calcifications on mammogram 10/27/2020    Splenomegaly 10/09/2020    History of adenomatous polyp of colon 10/29/2018    Hyperlipidemia 05/16/2018    Slow transit constipation 05/01/2018    Anxiety 08/26/2017    History of breast cancer 08/26/2017    Normocytic anemia 08/26/2017    Renal oncocytoma of left kidney 08/26/2017     Overview:   Left 2.6 centimeter enhancing renal mass  Biopsy October 6, 2017 demonstrating oncocytoma    I explained to the patient and her  that renal oncocytomas are benign. There are some renal cell carcinomas that can have oncocytic features and because of this renal cell carcinoma cannot be completely ruled out which was commented on by the pathologist..  Recommendation is to follow this with serial imaging. In reviewing the films appears to in its location and size that this should be able to be seen easily with renal ultrasound. If it grows we may consider rebiopsy. 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 Return to the office 8 weeks  2. Report any vaginal bleeding or discharge  3. Abstinence         She was  counseled on her preventative health maintenance recommendations and follow-up.            LAWRENCE Swan CNM,1/17/2023 1:07 PM

## 2023-01-24 ENCOUNTER — PROCEDURE VISIT (OUTPATIENT)
Dept: OBGYN | Age: 83
End: 2023-01-24
Payer: MEDICARE

## 2023-01-24 VITALS — BODY MASS INDEX: 23.49 KG/M2 | HEIGHT: 67 IN | SYSTOLIC BLOOD PRESSURE: 122 MMHG | DIASTOLIC BLOOD PRESSURE: 74 MMHG

## 2023-01-24 DIAGNOSIS — T83.89XD VAGINAL EROSION SECONDARY TO PESSARY USE, SUBSEQUENT ENCOUNTER: Primary | ICD-10-CM

## 2023-01-24 DIAGNOSIS — N99.3 VAGINAL VAULT PROLAPSE AFTER HYSTERECTOMY: ICD-10-CM

## 2023-01-24 DIAGNOSIS — N89.8 VAGINAL EROSION SECONDARY TO PESSARY USE, SUBSEQUENT ENCOUNTER: Primary | ICD-10-CM

## 2023-01-24 DIAGNOSIS — Z46.89 PESSARY MAINTENANCE: ICD-10-CM

## 2023-01-24 PROCEDURE — 99213 OFFICE O/P EST LOW 20 MIN: CPT | Performed by: ADVANCED PRACTICE MIDWIFE

## 2023-01-24 RX ORDER — M-VIT,TX,IRON,MINS/CALC/FOLIC 27MG-0.4MG
1 TABLET ORAL DAILY
COMMUNITY

## 2023-01-24 RX ORDER — CHOLECALCIFEROL (VITAMIN D3) 1250 MCG
CAPSULE ORAL
COMMUNITY

## 2023-01-24 NOTE — PROGRESS NOTES
PROBLEM VISIT     Date of service: 2023    Ruiz Dimas  Is a 81 y.o. , female    PT's PCP is: Leah Moore MD     : 1940                                             Subjective:       No LMP recorded (lmp unknown). Patient has had a hysterectomy. OB History    Para Term  AB Living   3 3 3     3   SAB IAB Ectopic Molar Multiple Live Births             3      # Outcome Date GA Lbr Chapo/2nd Weight Sex Delivery Anes PTL Lv   3 Term 73 40w0d  6 lb (2.722 kg) F Vag-Spont   NAGA   2 Term 70 40w0d  5 lb (2.268 kg) F Vag-Spont   NAGA   1 Term 11/15/64 40w0d  7 lb 0.5 oz (3.189 kg) M Vag-Spont   NAGA        Social History     Tobacco Use   Smoking Status Never   Smokeless Tobacco Never        Social History     Substance and Sexual Activity   Alcohol Use Yes    Comment: rarely       Allergies: Bactrim [sulfamethoxazole-trimethoprim], Levofloxacin, Nirmatrelvir-ritonavir, Penicillin g, and Sulfamethoxazole      Current Outpatient Medications:     Multiple Vitamins-Minerals (THERAPEUTIC MULTIVITAMIN-MINERALS) tablet, Take 1 tablet by mouth daily, Disp: , Rfl:     magnesium citrate solution, Take 296 mLs by mouth once, Disp: , Rfl:     Cholecalciferol (VITAMIN D3) 1.25 MG (21208 UT) CAPS, Take by mouth, Disp: , Rfl:     Cyanocobalamin (VITAMIN B 12 PO), Take by mouth, Disp: , Rfl:     Oxyquinoline-Sod Lauryl Sulf (TRIMO-MCCRAY) 0.025-0.01 % GEL, Place 1 applicator vaginally Twice a Week, Disp: 113.4 g, Rfl: 6    estradiol (ESTRACE) 0.1 MG/GM vaginal cream, PLACE 0.25 G VAGINALLY FOUR TIMES A WEEK, Disp: 42.5 g, Rfl: 5    allopurinol (ZYLOPRIM) 300 MG tablet, TAKE 1 TABLET BY MOUTH EVERY DAY, Disp: , Rfl:     escitalopram (LEXAPRO) 20 MG tablet, Take 20 mg by mouth daily, Disp: , Rfl:     LORazepam (ATIVAN) 1 MG tablet, Take 1 mg by mouth every 8 hours as needed. , Disp: , Rfl:     atorvastatin (LIPITOR) 10 MG tablet, Take 10 mg by mouth daily , Disp: , Rfl:     hydrOXYzine (ATARAX) 25 MG tablet, Take by mouth as needed, Disp: , Rfl:     azithromycin (ZITHROMAX) 250 MG tablet, TAKE 2 TABLETS BY MOUTH TODAY, THEN TAKE 1 TABLET DAILY FOR 4 DAYS (Patient not taking: Reported on 1/24/2023), Disp: , Rfl:     Social History     Substance and Sexual Activity   Sexual Activity Not Currently    Partners: Male       Last Yearly date:  10/04/2016    Last pap date and results: 10/04/2016    Last HPV date and results: unknown    Have you had a positive covid test: Yes    Have you had the covid immunization: Yes    Chief Complaint   Patient presents with    Vaginal Pain     Recheck vaginal pain/erosion. Pessary has not been in  for approx. 5 weeks. No complaints. PE:  Vital Signs  Blood pressure 122/74, height 5' 7\" (1.702 m), not currently breastfeeding. Estimated body mass index is 23.49 kg/m² as calculated from the following:    Height as of this encounter: 5' 7\" (1.702 m). Weight as of 1/17/23: 150 lb (68 kg). No data recorded    NURSE: Shade ESTRELLA    HPI: Today for assessment of vaginal erosion secondary to pessary use. Patient states she did have some bleeding episodes after she got home after her last visit on Wednesday and Thursday but then they stopped. Yes PT denies fever, chills, nausea and vomiting       Objective   Pelvic Exam: 3:00 area small irritation still noted Silver nitrate applied and posterior vaginal fornix looks to be healed well                              Assessment and Plan          Diagnosis Orders   1. Vaginal erosion secondary to pessary use, subsequent encounter        2. Vaginal vault prolapse after hysterectomy        3. Pessary maintenance                  I am having Diana Cousins maintain her hydrOXYzine HCl, atorvastatin, escitalopram, LORazepam, allopurinol, estradiol, Trimo-Phan, azithromycin, therapeutic multivitamin-minerals, magnesium citrate, Vitamin D3, and Cyanocobalamin (VITAMIN B 12 PO). Return for replace pessary.     She was also counseled on her preventative health maintenance recommendations and follow-up. There are no Patient Instructions on file for this visit.     LAWRENCE Parada CNM,1/24/2023 10:03 AM

## 2023-02-07 ENCOUNTER — PROCEDURE VISIT (OUTPATIENT)
Dept: OBGYN | Age: 83
End: 2023-02-07
Payer: MEDICARE

## 2023-02-07 VITALS
HEIGHT: 67 IN | SYSTOLIC BLOOD PRESSURE: 112 MMHG | BODY MASS INDEX: 24.17 KG/M2 | WEIGHT: 154 LBS | DIASTOLIC BLOOD PRESSURE: 68 MMHG

## 2023-02-07 DIAGNOSIS — N99.3 VAGINAL VAULT PROLAPSE AFTER HYSTERECTOMY: Primary | ICD-10-CM

## 2023-02-07 PROCEDURE — 1123F ACP DISCUSS/DSCN MKR DOCD: CPT | Performed by: OBSTETRICS & GYNECOLOGY

## 2023-02-07 PROCEDURE — G8484 FLU IMMUNIZE NO ADMIN: HCPCS | Performed by: OBSTETRICS & GYNECOLOGY

## 2023-02-07 PROCEDURE — G8400 PT W/DXA NO RESULTS DOC: HCPCS | Performed by: OBSTETRICS & GYNECOLOGY

## 2023-02-07 PROCEDURE — G8427 DOCREV CUR MEDS BY ELIG CLIN: HCPCS | Performed by: OBSTETRICS & GYNECOLOGY

## 2023-02-07 PROCEDURE — G8420 CALC BMI NORM PARAMETERS: HCPCS | Performed by: OBSTETRICS & GYNECOLOGY

## 2023-02-07 PROCEDURE — 1036F TOBACCO NON-USER: CPT | Performed by: OBSTETRICS & GYNECOLOGY

## 2023-02-07 PROCEDURE — 1090F PRES/ABSN URINE INCON ASSESS: CPT | Performed by: OBSTETRICS & GYNECOLOGY

## 2023-02-07 PROCEDURE — 99213 OFFICE O/P EST LOW 20 MIN: CPT | Performed by: OBSTETRICS & GYNECOLOGY

## 2023-02-07 NOTE — PROGRESS NOTES
PROBLEM VISIT - pessary check    Date of service: 2023    Irasema Navarrete  Is a 82 y.o.  female    PT's PCP is: Antoni Petit MD     : 1940                                           Allergies: Bactrim [sulfamethoxazole-trimethoprim], Levofloxacin, Nirmatrelvir-ritonavir, Penicillin g, and Sulfamethoxazole    Chief Complaint   Patient presents with    Procedure     Pt is here today for possible pessary placement. Pt has had vaginal erosion secondary to pessary so it has not been in for awhile.        Last pap: 10/4/16     Subjective:     PE:  Vital Signs  Blood pressure 112/68, height 5' 7\" (1.702 m), weight 154 lb (69.9 kg), not currently breastfeeding.     Labs:    No results found for this visit on 23.    NURSE: LMD    HPI:   The patient was seen today for vaginal exam and pessary placement. She denies any vaginal bleeding. She denies to any vaginal discharge or odor. Her bowels are regular and her voiding pattern is normal.     Objective:  Perineum/Speculum: There is not any signs of infection; The vaginal vault is without any signs of erythema or erosion. There is no vaginal discharge or odor appreciated.    The pessary provided by the patient was  placed without any problems and the patient tolerated procedure well and did not tolerate procedure well.     Assessment:   Diagnosis Orders   1. Vaginal vault prolapse after hysterectomy            Patient Active Problem List    Diagnosis Date Noted    Uterine prolapse 2022    Enterocele 2022    Frequency of urination 2022    Retention of urine 2022    Urge incontinence of urine 2022    Urinary incontinence 2022    Thrombocytopenia ([facility]) 2021    Uncomplicated bereavement 2021    Mantle cell lymphoma ([facility]) 2020    Breast calcifications on mammogram 10/27/2020    Splenomegaly 10/09/2020    History of adenomatous polyp of colon 10/29/2018    Hyperlipidemia 2018    Slow transit  constipation 05/01/2018    Anxiety 08/26/2017    History of breast cancer 08/26/2017    Normocytic anemia 08/26/2017    Renal oncocytoma of left kidney 08/26/2017     Overview:   Left 2.6 centimeter enhancing renal mass  Biopsy October 6, 2017 demonstrating oncocytoma    I explained to the patient and her  that renal oncocytomas are benign. There are some renal cell carcinomas that can have oncocytic features and because of this renal cell carcinoma cannot be completely ruled out which was commented on by the pathologist..  Recommendation is to follow this with serial imaging. In reviewing the films appears to in its location and size that this should be able to be seen easily with renal ultrasound. If it grows we may consider rebiopsy. 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 Return to the office 8-12 weeks  2. Report any vaginal bleeding or discharge  3. Abstinence         She was  counseled on her preventative health maintenance recommendations and follow-up.              FF time: 20 min    IVY Cruz,9/5/5822 12:03 PM

## 2023-02-20 DIAGNOSIS — N95.2 VAGINAL ATROPHY: ICD-10-CM

## 2023-02-20 DIAGNOSIS — Z46.89 PESSARY MAINTENANCE: ICD-10-CM

## 2023-02-20 RX ORDER — OXYQUINOLINE SULFATE AND SODIUM LAURYL SULFATE .25; .1 MG/G; MG/G
1 JELLY VAGINAL
Qty: 113.4 G | Refills: 6 | Status: SHIPPED | OUTPATIENT
Start: 2023-02-20

## 2023-02-21 ENCOUNTER — TELEPHONE (OUTPATIENT)
Dept: OBGYN | Age: 83
End: 2023-02-21

## 2023-06-25 SDOH — ECONOMIC STABILITY: INCOME INSECURITY: HOW HARD IS IT FOR YOU TO PAY FOR THE VERY BASICS LIKE FOOD, HOUSING, MEDICAL CARE, AND HEATING?: NOT HARD AT ALL

## 2023-06-25 SDOH — ECONOMIC STABILITY: FOOD INSECURITY: WITHIN THE PAST 12 MONTHS, YOU WORRIED THAT YOUR FOOD WOULD RUN OUT BEFORE YOU GOT MONEY TO BUY MORE.: NEVER TRUE

## 2023-06-25 SDOH — ECONOMIC STABILITY: FOOD INSECURITY: WITHIN THE PAST 12 MONTHS, THE FOOD YOU BOUGHT JUST DIDN'T LAST AND YOU DIDN'T HAVE MONEY TO GET MORE.: NEVER TRUE

## 2023-06-25 SDOH — ECONOMIC STABILITY: TRANSPORTATION INSECURITY
IN THE PAST 12 MONTHS, HAS LACK OF TRANSPORTATION KEPT YOU FROM MEETINGS, WORK, OR FROM GETTING THINGS NEEDED FOR DAILY LIVING?: NO

## 2023-06-25 SDOH — ECONOMIC STABILITY: HOUSING INSECURITY
IN THE LAST 12 MONTHS, WAS THERE A TIME WHEN YOU DID NOT HAVE A STEADY PLACE TO SLEEP OR SLEPT IN A SHELTER (INCLUDING NOW)?: NO

## 2023-06-26 ENCOUNTER — OFFICE VISIT (OUTPATIENT)
Dept: OBGYN | Age: 83
End: 2023-06-26
Payer: MEDICARE

## 2023-06-26 VITALS
SYSTOLIC BLOOD PRESSURE: 112 MMHG | BODY MASS INDEX: 25.11 KG/M2 | HEIGHT: 67 IN | WEIGHT: 160 LBS | DIASTOLIC BLOOD PRESSURE: 68 MMHG

## 2023-06-26 DIAGNOSIS — N95.2 VAGINAL ATROPHY: ICD-10-CM

## 2023-06-26 DIAGNOSIS — N89.8 VAGINAL EROSION: Primary | ICD-10-CM

## 2023-06-26 PROCEDURE — G8400 PT W/DXA NO RESULTS DOC: HCPCS | Performed by: OBSTETRICS & GYNECOLOGY

## 2023-06-26 PROCEDURE — 1036F TOBACCO NON-USER: CPT | Performed by: OBSTETRICS & GYNECOLOGY

## 2023-06-26 PROCEDURE — 17250 CHEM CAUT OF GRANLTJ TISSUE: CPT | Performed by: OBSTETRICS & GYNECOLOGY

## 2023-06-26 PROCEDURE — 99211 OFF/OP EST MAY X REQ PHY/QHP: CPT | Performed by: OBSTETRICS & GYNECOLOGY

## 2023-06-26 PROCEDURE — G8419 CALC BMI OUT NRM PARAM NOF/U: HCPCS | Performed by: OBSTETRICS & GYNECOLOGY

## 2023-06-26 PROCEDURE — 99213 OFFICE O/P EST LOW 20 MIN: CPT | Performed by: OBSTETRICS & GYNECOLOGY

## 2023-06-26 PROCEDURE — 1090F PRES/ABSN URINE INCON ASSESS: CPT | Performed by: OBSTETRICS & GYNECOLOGY

## 2023-06-26 PROCEDURE — G8427 DOCREV CUR MEDS BY ELIG CLIN: HCPCS | Performed by: OBSTETRICS & GYNECOLOGY

## 2023-06-26 PROCEDURE — 1123F ACP DISCUSS/DSCN MKR DOCD: CPT | Performed by: OBSTETRICS & GYNECOLOGY

## 2023-06-26 RX ORDER — ESTRADIOL 0.1 MG/G
0.25 CREAM VAGINAL
Qty: 42.5 G | Refills: 5 | Status: SHIPPED | OUTPATIENT
Start: 2023-06-27

## 2023-06-26 RX ORDER — ERGOCALCIFEROL 1.25 MG/1
CAPSULE ORAL
COMMUNITY
Start: 2023-05-30

## 2023-06-26 RX ORDER — OXYQUINOLINE SULFATE AND SODIUM LAURYL SULFATE .25; .1 MG/G; MG/G
1 JELLY VAGINAL
Qty: 113.4 G | Refills: 6 | Status: SHIPPED | OUTPATIENT
Start: 2023-06-26

## 2023-07-05 ENCOUNTER — PROCEDURE VISIT (OUTPATIENT)
Dept: OBGYN | Age: 83
End: 2023-07-05

## 2023-07-05 VITALS
DIASTOLIC BLOOD PRESSURE: 62 MMHG | SYSTOLIC BLOOD PRESSURE: 112 MMHG | HEIGHT: 67 IN | WEIGHT: 158 LBS | BODY MASS INDEX: 24.8 KG/M2

## 2023-07-05 DIAGNOSIS — N89.8 VAGINAL EROSION: Primary | ICD-10-CM

## 2023-07-05 DIAGNOSIS — N95.2 VAGINAL ATROPHY: ICD-10-CM

## 2023-07-05 DIAGNOSIS — Z46.89 PESSARY MAINTENANCE: ICD-10-CM

## 2023-07-05 RX ORDER — OXYQUINOLINE SULFATE AND SODIUM LAURYL SULFATE .25; .1 MG/G; MG/G
1 JELLY VAGINAL
Qty: 113.4 G | Refills: 6 | Status: SHIPPED | OUTPATIENT
Start: 2023-07-06

## 2023-07-05 RX ORDER — FLUOROURACIL 50 MG/ML
SOLUTION TOPICAL
COMMUNITY
Start: 2023-06-29

## 2023-07-05 NOTE — PROGRESS NOTES
PROBLEM VISIT - pessary check    Date of service: 2023    Ale Nunez  Is a 81 y.o.  female    PT's PCP is: Katelin Mujica MD     : 1940                                           Allergies: Bactrim [sulfamethoxazole-trimethoprim], Levofloxacin, Nirmatrelvir-ritonavir, Penicillin g, and Sulfamethoxazole    Chief Complaint   Patient presents with    Other     Pessary maintenance, pt declines vaginal pain, declines vaginal bleeding. Pt would like her pessary to be placed again        Last pap: 10/4/16     Subjective:     PE:  Vital Signs  Blood pressure 112/62, height 5' 7\" (1.702 m), weight 158 lb (71.7 kg), not currently breastfeeding. Labs:    No results found for this visit on 23. NURSE: JOSSELINE    HPI:   The patient was seen today for 5 weeks follow-up posttreatment for vaginal erosion. . She denies any vaginal bleeding. She denies to any vaginal discharge or odor. Her bowels are regular and her voiding pattern is normal.  Patient states she is completely ready to have this pessary replaced as she has been having a lot of discomfort    Objective:  Perineum/Speculum: There is not any signs of infection; The vaginal vault is without any signs of erythema or erosion. There is no vaginal discharge or odor appreciated. The pessary was cleansed and replacedwithout any problems and the patient tolerated procedure well. Assessment:   Diagnosis Orders   1. Vaginal erosion        2.  Vaginal atrophy  Oxyquinoline-Sod Lauryl Sulf (TRIMO-MCCRAY) 0.025-0.01 % GEL      3. Pessary maintenance            Patient Active Problem List    Diagnosis Date Noted    Uterine prolapse 2022    Enterocele 2022    Frequency of urination 2022    Retention of urine 2022    Urge incontinence of urine 2022    Urinary incontinence 2022    Thrombocytopenia ([address])     Uncomplicated bereavement     Mantle cell lymphoma ([address]) 2020    Breast